# Patient Record
Sex: FEMALE | Race: WHITE | NOT HISPANIC OR LATINO | Employment: OTHER | URBAN - NONMETROPOLITAN AREA
[De-identification: names, ages, dates, MRNs, and addresses within clinical notes are randomized per-mention and may not be internally consistent; named-entity substitution may affect disease eponyms.]

---

## 2017-01-23 ENCOUNTER — APPOINTMENT (OUTPATIENT)
Dept: PHYSICAL THERAPY | Facility: CLINIC | Age: 67
End: 2017-01-23
Payer: MEDICARE

## 2017-01-23 PROCEDURE — G8982 BODY POS GOAL STATUS: HCPCS

## 2017-01-23 PROCEDURE — 97110 THERAPEUTIC EXERCISES: CPT

## 2017-01-23 PROCEDURE — 97116 GAIT TRAINING THERAPY: CPT

## 2017-01-23 PROCEDURE — 97140 MANUAL THERAPY 1/> REGIONS: CPT

## 2017-01-23 PROCEDURE — G8981 BODY POS CURRENT STATUS: HCPCS

## 2017-01-23 PROCEDURE — 97161 PT EVAL LOW COMPLEX 20 MIN: CPT

## 2017-01-25 ENCOUNTER — APPOINTMENT (OUTPATIENT)
Dept: PHYSICAL THERAPY | Facility: CLINIC | Age: 67
End: 2017-01-25
Payer: MEDICARE

## 2017-01-25 PROCEDURE — 97140 MANUAL THERAPY 1/> REGIONS: CPT

## 2017-01-25 PROCEDURE — 97110 THERAPEUTIC EXERCISES: CPT

## 2017-01-26 ENCOUNTER — APPOINTMENT (OUTPATIENT)
Dept: PHYSICAL THERAPY | Facility: CLINIC | Age: 67
End: 2017-01-26
Payer: MEDICARE

## 2017-01-26 PROCEDURE — 97140 MANUAL THERAPY 1/> REGIONS: CPT

## 2017-01-26 PROCEDURE — 97110 THERAPEUTIC EXERCISES: CPT

## 2017-01-26 PROCEDURE — 97010 HOT OR COLD PACKS THERAPY: CPT

## 2017-01-30 ENCOUNTER — APPOINTMENT (OUTPATIENT)
Dept: PHYSICAL THERAPY | Facility: CLINIC | Age: 67
End: 2017-01-30
Payer: MEDICARE

## 2017-01-30 PROCEDURE — 97110 THERAPEUTIC EXERCISES: CPT

## 2017-01-30 PROCEDURE — 97010 HOT OR COLD PACKS THERAPY: CPT

## 2017-01-30 PROCEDURE — 97140 MANUAL THERAPY 1/> REGIONS: CPT

## 2017-02-01 ENCOUNTER — APPOINTMENT (OUTPATIENT)
Dept: PHYSICAL THERAPY | Facility: CLINIC | Age: 67
End: 2017-02-01
Payer: MEDICARE

## 2017-02-01 PROCEDURE — 97140 MANUAL THERAPY 1/> REGIONS: CPT

## 2017-02-01 PROCEDURE — 97110 THERAPEUTIC EXERCISES: CPT

## 2017-02-01 PROCEDURE — 97014 ELECTRIC STIMULATION THERAPY: CPT

## 2017-02-02 ENCOUNTER — APPOINTMENT (OUTPATIENT)
Dept: PHYSICAL THERAPY | Facility: CLINIC | Age: 67
End: 2017-02-02
Payer: MEDICARE

## 2017-02-02 PROCEDURE — 97116 GAIT TRAINING THERAPY: CPT

## 2017-02-02 PROCEDURE — 97110 THERAPEUTIC EXERCISES: CPT

## 2017-02-02 PROCEDURE — 97140 MANUAL THERAPY 1/> REGIONS: CPT

## 2017-02-02 PROCEDURE — 97014 ELECTRIC STIMULATION THERAPY: CPT

## 2017-02-06 ENCOUNTER — APPOINTMENT (OUTPATIENT)
Dept: PHYSICAL THERAPY | Facility: CLINIC | Age: 67
End: 2017-02-06
Payer: MEDICARE

## 2017-02-06 PROCEDURE — 97110 THERAPEUTIC EXERCISES: CPT

## 2017-02-06 PROCEDURE — 97140 MANUAL THERAPY 1/> REGIONS: CPT

## 2017-02-06 PROCEDURE — 97010 HOT OR COLD PACKS THERAPY: CPT

## 2017-02-08 ENCOUNTER — APPOINTMENT (OUTPATIENT)
Dept: PHYSICAL THERAPY | Facility: CLINIC | Age: 67
End: 2017-02-08
Payer: MEDICARE

## 2017-02-08 PROCEDURE — 97110 THERAPEUTIC EXERCISES: CPT

## 2017-02-08 PROCEDURE — 97010 HOT OR COLD PACKS THERAPY: CPT

## 2017-02-08 PROCEDURE — 97140 MANUAL THERAPY 1/> REGIONS: CPT

## 2017-02-09 ENCOUNTER — APPOINTMENT (OUTPATIENT)
Dept: PHYSICAL THERAPY | Facility: CLINIC | Age: 67
End: 2017-02-09
Payer: MEDICARE

## 2017-02-13 ENCOUNTER — APPOINTMENT (OUTPATIENT)
Dept: PHYSICAL THERAPY | Facility: CLINIC | Age: 67
End: 2017-02-13
Payer: MEDICARE

## 2017-02-13 PROCEDURE — 97140 MANUAL THERAPY 1/> REGIONS: CPT

## 2017-02-13 PROCEDURE — 97014 ELECTRIC STIMULATION THERAPY: CPT

## 2017-02-13 PROCEDURE — 97110 THERAPEUTIC EXERCISES: CPT

## 2017-02-15 ENCOUNTER — APPOINTMENT (OUTPATIENT)
Dept: PHYSICAL THERAPY | Facility: CLINIC | Age: 67
End: 2017-02-15
Payer: MEDICARE

## 2017-02-15 PROCEDURE — 97110 THERAPEUTIC EXERCISES: CPT

## 2017-02-15 PROCEDURE — 97140 MANUAL THERAPY 1/> REGIONS: CPT

## 2017-02-15 PROCEDURE — G8982 BODY POS GOAL STATUS: HCPCS

## 2017-02-15 PROCEDURE — 97010 HOT OR COLD PACKS THERAPY: CPT

## 2017-02-15 PROCEDURE — 97150 GROUP THERAPEUTIC PROCEDURES: CPT

## 2017-02-15 PROCEDURE — G8981 BODY POS CURRENT STATUS: HCPCS

## 2017-02-16 ENCOUNTER — APPOINTMENT (OUTPATIENT)
Dept: PHYSICAL THERAPY | Facility: CLINIC | Age: 67
End: 2017-02-16
Payer: MEDICARE

## 2017-02-16 PROCEDURE — 97150 GROUP THERAPEUTIC PROCEDURES: CPT

## 2017-02-16 PROCEDURE — 97110 THERAPEUTIC EXERCISES: CPT

## 2017-02-16 PROCEDURE — 97140 MANUAL THERAPY 1/> REGIONS: CPT

## 2017-02-16 PROCEDURE — 97010 HOT OR COLD PACKS THERAPY: CPT

## 2017-02-20 ENCOUNTER — APPOINTMENT (OUTPATIENT)
Dept: PHYSICAL THERAPY | Facility: CLINIC | Age: 67
End: 2017-02-20
Payer: MEDICARE

## 2017-02-20 PROCEDURE — 97140 MANUAL THERAPY 1/> REGIONS: CPT

## 2017-02-20 PROCEDURE — 97010 HOT OR COLD PACKS THERAPY: CPT

## 2017-02-20 PROCEDURE — 97110 THERAPEUTIC EXERCISES: CPT

## 2017-02-21 ENCOUNTER — GENERIC CONVERSION - ENCOUNTER (OUTPATIENT)
Dept: OTHER | Facility: OTHER | Age: 67
End: 2017-02-21

## 2017-02-22 ENCOUNTER — APPOINTMENT (OUTPATIENT)
Dept: PHYSICAL THERAPY | Facility: CLINIC | Age: 67
End: 2017-02-22
Payer: MEDICARE

## 2017-02-22 PROCEDURE — 97140 MANUAL THERAPY 1/> REGIONS: CPT

## 2017-02-22 PROCEDURE — 97110 THERAPEUTIC EXERCISES: CPT

## 2017-02-22 PROCEDURE — 97010 HOT OR COLD PACKS THERAPY: CPT

## 2017-02-23 ENCOUNTER — APPOINTMENT (OUTPATIENT)
Dept: PHYSICAL THERAPY | Facility: CLINIC | Age: 67
End: 2017-02-23
Payer: MEDICARE

## 2017-02-23 PROCEDURE — 97110 THERAPEUTIC EXERCISES: CPT

## 2017-02-23 PROCEDURE — 97140 MANUAL THERAPY 1/> REGIONS: CPT

## 2017-02-23 PROCEDURE — 97010 HOT OR COLD PACKS THERAPY: CPT

## 2017-02-24 ENCOUNTER — LAB REQUISITION (OUTPATIENT)
Dept: LAB | Facility: HOSPITAL | Age: 67
End: 2017-02-24
Payer: MEDICARE

## 2017-02-24 ENCOUNTER — ALLSCRIPTS OFFICE VISIT (OUTPATIENT)
Dept: FAMILY MEDICINE CLINIC | Facility: CLINIC | Age: 67
End: 2017-02-24
Payer: MEDICARE

## 2017-02-24 DIAGNOSIS — R80.9 PROTEINURIA: ICD-10-CM

## 2017-02-24 DIAGNOSIS — E11.9 TYPE 2 DIABETES MELLITUS WITHOUT COMPLICATIONS (HCC): ICD-10-CM

## 2017-02-24 DIAGNOSIS — N28.9 DISORDER OF KIDNEY AND URETER: ICD-10-CM

## 2017-02-24 DIAGNOSIS — E78.5 HYPERLIPIDEMIA: ICD-10-CM

## 2017-02-24 DIAGNOSIS — N18.30 CHRONIC KIDNEY DISEASE, STAGE III (MODERATE) (HCC): ICD-10-CM

## 2017-02-24 DIAGNOSIS — N25.81 SECONDARY HYPERPARATHYROIDISM OF RENAL ORIGIN (HCC): ICD-10-CM

## 2017-02-24 DIAGNOSIS — E03.9 HYPOTHYROIDISM: ICD-10-CM

## 2017-02-24 LAB
ANION GAP SERPL CALCULATED.3IONS-SCNC: 8 MMOL/L (ref 4–13)
BACTERIA UR QL AUTO: ABNORMAL /HPF
BILIRUB UR QL STRIP: NEGATIVE
BUN SERPL-MCNC: 31 MG/DL (ref 5–25)
CALCIUM SERPL-MCNC: 9.6 MG/DL (ref 8.3–10.1)
CHLORIDE SERPL-SCNC: 103 MMOL/L (ref 100–108)
CLARITY UR: CLEAR
CO2 SERPL-SCNC: 28 MMOL/L (ref 21–32)
COLOR UR: ABNORMAL
CREAT SERPL-MCNC: 1.5 MG/DL (ref 0.6–1.3)
CREAT UR-MCNC: 154 MG/DL
GFR SERPL CREATININE-BSD FRML MDRD: 34.5 ML/MIN/1.73SQ M
GLUCOSE SERPL-MCNC: 112 MG/DL (ref 65–140)
GLUCOSE UR STRIP-MCNC: NEGATIVE MG/DL
HGB UR QL STRIP.AUTO: NEGATIVE
HYALINE CASTS #/AREA URNS LPF: ABNORMAL /LPF
KETONES UR STRIP-MCNC: NEGATIVE MG/DL
LEUKOCYTE ESTERASE UR QL STRIP: NEGATIVE
MICROALBUMIN UR-MCNC: 49 MG/L (ref 0–20)
MICROALBUMIN/CREAT 24H UR: 32 MG/G CREATININE (ref 0–30)
NITRITE UR QL STRIP: NEGATIVE
NON-SQ EPI CELLS URNS QL MICRO: ABNORMAL /HPF
PH UR STRIP.AUTO: 5.5 [PH] (ref 4.5–8)
POTASSIUM SERPL-SCNC: 4.5 MMOL/L (ref 3.5–5.3)
PROT UR STRIP-MCNC: NEGATIVE MG/DL
PTH-INTACT SERPL-MCNC: 11.6 PG/ML (ref 14–72)
RBC #/AREA URNS AUTO: ABNORMAL /HPF
SODIUM SERPL-SCNC: 139 MMOL/L (ref 136–145)
SP GR UR STRIP.AUTO: 1.02 (ref 1–1.03)
UROBILINOGEN UR QL STRIP.AUTO: 0.2 E.U./DL
WBC #/AREA URNS AUTO: ABNORMAL /HPF

## 2017-02-24 PROCEDURE — 82570 ASSAY OF URINE CREATININE: CPT | Performed by: INTERNAL MEDICINE

## 2017-02-24 PROCEDURE — 83970 ASSAY OF PARATHORMONE: CPT | Performed by: INTERNAL MEDICINE

## 2017-02-24 PROCEDURE — 81001 URINALYSIS AUTO W/SCOPE: CPT | Performed by: INTERNAL MEDICINE

## 2017-02-24 PROCEDURE — 82043 UR ALBUMIN QUANTITATIVE: CPT | Performed by: INTERNAL MEDICINE

## 2017-02-24 PROCEDURE — 99213 OFFICE O/P EST LOW 20 MIN: CPT | Performed by: PHYSICIAN ASSISTANT

## 2017-02-24 PROCEDURE — 80048 BASIC METABOLIC PNL TOTAL CA: CPT | Performed by: INTERNAL MEDICINE

## 2017-02-28 ENCOUNTER — APPOINTMENT (OUTPATIENT)
Dept: PHYSICAL THERAPY | Facility: CLINIC | Age: 67
End: 2017-02-28
Payer: MEDICARE

## 2017-02-28 PROCEDURE — 97010 HOT OR COLD PACKS THERAPY: CPT

## 2017-02-28 PROCEDURE — 97110 THERAPEUTIC EXERCISES: CPT

## 2017-02-28 PROCEDURE — 97140 MANUAL THERAPY 1/> REGIONS: CPT

## 2017-03-01 ENCOUNTER — APPOINTMENT (OUTPATIENT)
Dept: PHYSICAL THERAPY | Facility: CLINIC | Age: 67
End: 2017-03-01
Payer: MEDICARE

## 2017-03-01 PROCEDURE — 97110 THERAPEUTIC EXERCISES: CPT

## 2017-03-01 PROCEDURE — 97140 MANUAL THERAPY 1/> REGIONS: CPT

## 2017-03-02 ENCOUNTER — APPOINTMENT (OUTPATIENT)
Dept: PHYSICAL THERAPY | Facility: CLINIC | Age: 67
End: 2017-03-02
Payer: MEDICARE

## 2017-03-02 ENCOUNTER — GENERIC CONVERSION - ENCOUNTER (OUTPATIENT)
Dept: OTHER | Facility: OTHER | Age: 67
End: 2017-03-02

## 2017-03-02 PROCEDURE — 97110 THERAPEUTIC EXERCISES: CPT

## 2017-03-02 PROCEDURE — 97140 MANUAL THERAPY 1/> REGIONS: CPT

## 2017-03-02 PROCEDURE — 97010 HOT OR COLD PACKS THERAPY: CPT

## 2017-03-07 ENCOUNTER — APPOINTMENT (OUTPATIENT)
Dept: PHYSICAL THERAPY | Facility: CLINIC | Age: 67
End: 2017-03-07
Payer: MEDICARE

## 2017-03-07 PROCEDURE — 97110 THERAPEUTIC EXERCISES: CPT

## 2017-03-07 PROCEDURE — 97140 MANUAL THERAPY 1/> REGIONS: CPT

## 2017-03-08 ENCOUNTER — APPOINTMENT (OUTPATIENT)
Dept: PHYSICAL THERAPY | Facility: CLINIC | Age: 67
End: 2017-03-08
Payer: MEDICARE

## 2017-03-08 PROCEDURE — 97140 MANUAL THERAPY 1/> REGIONS: CPT

## 2017-03-08 PROCEDURE — 97110 THERAPEUTIC EXERCISES: CPT

## 2017-03-08 PROCEDURE — 97010 HOT OR COLD PACKS THERAPY: CPT

## 2017-03-09 ENCOUNTER — APPOINTMENT (OUTPATIENT)
Dept: PHYSICAL THERAPY | Facility: CLINIC | Age: 67
End: 2017-03-09
Payer: MEDICARE

## 2017-03-09 PROCEDURE — 97140 MANUAL THERAPY 1/> REGIONS: CPT

## 2017-03-09 PROCEDURE — G8982 BODY POS GOAL STATUS: HCPCS

## 2017-03-09 PROCEDURE — G8981 BODY POS CURRENT STATUS: HCPCS

## 2017-03-09 PROCEDURE — 97014 ELECTRIC STIMULATION THERAPY: CPT

## 2017-03-09 PROCEDURE — 97110 THERAPEUTIC EXERCISES: CPT

## 2017-03-13 ENCOUNTER — APPOINTMENT (OUTPATIENT)
Dept: PHYSICAL THERAPY | Facility: CLINIC | Age: 67
End: 2017-03-13
Payer: MEDICARE

## 2017-03-13 PROCEDURE — 97110 THERAPEUTIC EXERCISES: CPT

## 2017-03-13 PROCEDURE — 97140 MANUAL THERAPY 1/> REGIONS: CPT

## 2017-03-14 ENCOUNTER — APPOINTMENT (OUTPATIENT)
Dept: PHYSICAL THERAPY | Facility: CLINIC | Age: 67
End: 2017-03-14
Payer: MEDICARE

## 2017-03-15 ENCOUNTER — APPOINTMENT (OUTPATIENT)
Dept: PHYSICAL THERAPY | Facility: CLINIC | Age: 67
End: 2017-03-15
Payer: MEDICARE

## 2017-03-15 PROCEDURE — 97110 THERAPEUTIC EXERCISES: CPT

## 2017-03-15 PROCEDURE — 97140 MANUAL THERAPY 1/> REGIONS: CPT

## 2017-03-17 ENCOUNTER — APPOINTMENT (OUTPATIENT)
Dept: PHYSICAL THERAPY | Facility: CLINIC | Age: 67
End: 2017-03-17
Payer: MEDICARE

## 2017-03-17 PROCEDURE — 97140 MANUAL THERAPY 1/> REGIONS: CPT

## 2017-03-17 PROCEDURE — 97110 THERAPEUTIC EXERCISES: CPT

## 2017-03-28 ENCOUNTER — APPOINTMENT (OUTPATIENT)
Dept: PHYSICAL THERAPY | Facility: CLINIC | Age: 67
End: 2017-03-28
Payer: MEDICARE

## 2017-03-28 PROCEDURE — 97140 MANUAL THERAPY 1/> REGIONS: CPT

## 2017-03-28 PROCEDURE — 97110 THERAPEUTIC EXERCISES: CPT

## 2017-03-30 ENCOUNTER — APPOINTMENT (OUTPATIENT)
Dept: PHYSICAL THERAPY | Facility: CLINIC | Age: 67
End: 2017-03-30
Payer: MEDICARE

## 2017-03-30 ENCOUNTER — TRANSCRIBE ORDERS (OUTPATIENT)
Dept: LAB | Facility: MEDICAL CENTER | Age: 67
End: 2017-03-30

## 2017-03-30 ENCOUNTER — APPOINTMENT (OUTPATIENT)
Dept: LAB | Facility: MEDICAL CENTER | Age: 67
End: 2017-03-30
Payer: MEDICARE

## 2017-03-30 DIAGNOSIS — E11.9 TYPE 2 DIABETES MELLITUS WITHOUT COMPLICATIONS (HCC): ICD-10-CM

## 2017-03-30 DIAGNOSIS — N28.9 DISORDER OF KIDNEY AND URETER: ICD-10-CM

## 2017-03-30 DIAGNOSIS — N28.9 UNSPECIFIED DISORDER OF KIDNEY AND URETER: Primary | ICD-10-CM

## 2017-03-30 DIAGNOSIS — E78.5 HYPERLIPIDEMIA: ICD-10-CM

## 2017-03-30 DIAGNOSIS — E03.9 HYPOTHYROIDISM: ICD-10-CM

## 2017-03-30 LAB
ALBUMIN SERPL BCP-MCNC: 3.5 G/DL (ref 3.5–5)
ALP SERPL-CCNC: 64 U/L (ref 46–116)
ALT SERPL W P-5'-P-CCNC: 39 U/L (ref 12–78)
ANION GAP SERPL CALCULATED.3IONS-SCNC: 7 MMOL/L (ref 4–13)
AST SERPL W P-5'-P-CCNC: 35 U/L (ref 5–45)
BASOPHILS # BLD AUTO: 0.03 THOUSANDS/ΜL (ref 0–0.1)
BASOPHILS NFR BLD AUTO: 0 % (ref 0–1)
BILIRUB SERPL-MCNC: 0.42 MG/DL (ref 0.2–1)
BUN SERPL-MCNC: 29 MG/DL (ref 5–25)
CALCIUM SERPL-MCNC: 9.4 MG/DL (ref 8.3–10.1)
CHLORIDE SERPL-SCNC: 101 MMOL/L (ref 100–108)
CHOLEST SERPL-MCNC: 216 MG/DL (ref 50–200)
CO2 SERPL-SCNC: 29 MMOL/L (ref 21–32)
CREAT SERPL-MCNC: 1.47 MG/DL (ref 0.6–1.3)
CREAT UR-MCNC: 174 MG/DL
EOSINOPHIL # BLD AUTO: 0.39 THOUSAND/ΜL (ref 0–0.61)
EOSINOPHIL NFR BLD AUTO: 6 % (ref 0–6)
ERYTHROCYTE [DISTWIDTH] IN BLOOD BY AUTOMATED COUNT: 13.7 % (ref 11.6–15.1)
GFR SERPL CREATININE-BSD FRML MDRD: 35.3 ML/MIN/1.73SQ M
GLUCOSE P FAST SERPL-MCNC: 91 MG/DL (ref 65–99)
HCT VFR BLD AUTO: 34.7 % (ref 34.8–46.1)
HDLC SERPL-MCNC: 34 MG/DL (ref 40–60)
HGB BLD-MCNC: 11.5 G/DL (ref 11.5–15.4)
LDLC SERPL CALC-MCNC: 130 MG/DL (ref 0–100)
LYMPHOCYTES # BLD AUTO: 2.59 THOUSANDS/ΜL (ref 0.6–4.47)
LYMPHOCYTES NFR BLD AUTO: 38 % (ref 14–44)
MCH RBC QN AUTO: 32.4 PG (ref 26.8–34.3)
MCHC RBC AUTO-ENTMCNC: 33.1 G/DL (ref 31.4–37.4)
MCV RBC AUTO: 98 FL (ref 82–98)
MICROALBUMIN UR-MCNC: 82.9 MG/L (ref 0–20)
MICROALBUMIN/CREAT 24H UR: 48 MG/G CREATININE (ref 0–30)
MONOCYTES # BLD AUTO: 0.41 THOUSAND/ΜL (ref 0.17–1.22)
MONOCYTES NFR BLD AUTO: 6 % (ref 4–12)
NEUTROPHILS # BLD AUTO: 3.31 THOUSANDS/ΜL (ref 1.85–7.62)
NEUTS SEG NFR BLD AUTO: 50 % (ref 43–75)
NRBC BLD AUTO-RTO: 0 /100 WBCS
PLATELET # BLD AUTO: 213 THOUSANDS/UL (ref 149–390)
PMV BLD AUTO: 12.2 FL (ref 8.9–12.7)
POTASSIUM SERPL-SCNC: 4 MMOL/L (ref 3.5–5.3)
PROT SERPL-MCNC: 7.8 G/DL (ref 6.4–8.2)
RBC # BLD AUTO: 3.55 MILLION/UL (ref 3.81–5.12)
SODIUM SERPL-SCNC: 137 MMOL/L (ref 136–145)
TRIGL SERPL-MCNC: 258 MG/DL
TSH SERPL DL<=0.05 MIU/L-ACNC: 0.78 UIU/ML (ref 0.36–3.74)
WBC # BLD AUTO: 6.74 THOUSAND/UL (ref 4.31–10.16)

## 2017-03-30 PROCEDURE — 97014 ELECTRIC STIMULATION THERAPY: CPT

## 2017-03-30 PROCEDURE — 82043 UR ALBUMIN QUANTITATIVE: CPT | Performed by: PHYSICIAN ASSISTANT

## 2017-03-30 PROCEDURE — 80061 LIPID PANEL: CPT

## 2017-03-30 PROCEDURE — 97110 THERAPEUTIC EXERCISES: CPT

## 2017-03-30 PROCEDURE — 80053 COMPREHEN METABOLIC PANEL: CPT

## 2017-03-30 PROCEDURE — 36415 COLL VENOUS BLD VENIPUNCTURE: CPT

## 2017-03-30 PROCEDURE — 84443 ASSAY THYROID STIM HORMONE: CPT

## 2017-03-30 PROCEDURE — 85025 COMPLETE CBC W/AUTO DIFF WBC: CPT

## 2017-03-30 PROCEDURE — 97140 MANUAL THERAPY 1/> REGIONS: CPT

## 2017-03-30 PROCEDURE — 82570 ASSAY OF URINE CREATININE: CPT | Performed by: PHYSICIAN ASSISTANT

## 2017-03-31 ENCOUNTER — APPOINTMENT (OUTPATIENT)
Dept: PHYSICAL THERAPY | Facility: CLINIC | Age: 67
End: 2017-03-31
Payer: MEDICARE

## 2017-03-31 PROCEDURE — 97110 THERAPEUTIC EXERCISES: CPT

## 2017-03-31 PROCEDURE — 97140 MANUAL THERAPY 1/> REGIONS: CPT

## 2017-03-31 PROCEDURE — 97014 ELECTRIC STIMULATION THERAPY: CPT

## 2017-04-04 ENCOUNTER — APPOINTMENT (OUTPATIENT)
Dept: PHYSICAL THERAPY | Facility: CLINIC | Age: 67
End: 2017-04-04
Payer: MEDICARE

## 2017-04-04 PROCEDURE — 97010 HOT OR COLD PACKS THERAPY: CPT

## 2017-04-04 PROCEDURE — 97110 THERAPEUTIC EXERCISES: CPT

## 2017-04-04 PROCEDURE — 97140 MANUAL THERAPY 1/> REGIONS: CPT

## 2017-04-05 ENCOUNTER — ALLSCRIPTS OFFICE VISIT (OUTPATIENT)
Dept: FAMILY MEDICINE CLINIC | Facility: CLINIC | Age: 67
End: 2017-04-05
Payer: MEDICARE

## 2017-04-05 ENCOUNTER — APPOINTMENT (OUTPATIENT)
Dept: PHYSICAL THERAPY | Facility: CLINIC | Age: 67
End: 2017-04-05
Payer: MEDICARE

## 2017-04-05 LAB
GLUCOSE SERPL-MCNC: 122 MG/DL
GLUCOSE SERPL-MCNC: 122 MG/DL (ref 65–140)
HBA1C MFR BLD HPLC: 6.2 %

## 2017-04-05 PROCEDURE — 82948 REAGENT STRIP/BLOOD GLUCOSE: CPT | Performed by: PHYSICIAN ASSISTANT

## 2017-04-05 PROCEDURE — 99214 OFFICE O/P EST MOD 30 MIN: CPT | Performed by: PHYSICIAN ASSISTANT

## 2017-04-05 PROCEDURE — 83036 HEMOGLOBIN GLYCOSYLATED A1C: CPT | Performed by: PHYSICIAN ASSISTANT

## 2017-04-06 ENCOUNTER — APPOINTMENT (OUTPATIENT)
Dept: PHYSICAL THERAPY | Facility: CLINIC | Age: 67
End: 2017-04-06
Payer: MEDICARE

## 2017-04-06 ENCOUNTER — GENERIC CONVERSION - ENCOUNTER (OUTPATIENT)
Dept: OTHER | Facility: OTHER | Age: 67
End: 2017-04-06

## 2017-04-06 PROCEDURE — 97140 MANUAL THERAPY 1/> REGIONS: CPT

## 2017-04-06 PROCEDURE — 97010 HOT OR COLD PACKS THERAPY: CPT

## 2017-04-06 PROCEDURE — G8981 BODY POS CURRENT STATUS: HCPCS

## 2017-04-06 PROCEDURE — 97110 THERAPEUTIC EXERCISES: CPT

## 2017-04-06 PROCEDURE — G8982 BODY POS GOAL STATUS: HCPCS

## 2017-04-11 ENCOUNTER — APPOINTMENT (OUTPATIENT)
Dept: PHYSICAL THERAPY | Facility: CLINIC | Age: 67
End: 2017-04-11
Payer: MEDICARE

## 2017-04-11 PROCEDURE — 97140 MANUAL THERAPY 1/> REGIONS: CPT

## 2017-04-11 PROCEDURE — 97014 ELECTRIC STIMULATION THERAPY: CPT

## 2017-04-11 PROCEDURE — 97110 THERAPEUTIC EXERCISES: CPT

## 2017-04-12 ENCOUNTER — APPOINTMENT (OUTPATIENT)
Dept: PHYSICAL THERAPY | Facility: CLINIC | Age: 67
End: 2017-04-12
Payer: MEDICARE

## 2017-04-12 PROCEDURE — 97140 MANUAL THERAPY 1/> REGIONS: CPT

## 2017-04-12 PROCEDURE — 97010 HOT OR COLD PACKS THERAPY: CPT

## 2017-04-12 PROCEDURE — 97110 THERAPEUTIC EXERCISES: CPT

## 2017-04-13 ENCOUNTER — APPOINTMENT (OUTPATIENT)
Dept: PHYSICAL THERAPY | Facility: CLINIC | Age: 67
End: 2017-04-13
Payer: MEDICARE

## 2017-04-13 PROCEDURE — 97110 THERAPEUTIC EXERCISES: CPT

## 2017-04-13 PROCEDURE — 97140 MANUAL THERAPY 1/> REGIONS: CPT

## 2017-04-13 PROCEDURE — 97014 ELECTRIC STIMULATION THERAPY: CPT

## 2017-04-17 ENCOUNTER — GENERIC CONVERSION - ENCOUNTER (OUTPATIENT)
Dept: OTHER | Facility: OTHER | Age: 67
End: 2017-04-17

## 2017-04-18 ENCOUNTER — APPOINTMENT (OUTPATIENT)
Dept: PHYSICAL THERAPY | Facility: CLINIC | Age: 67
End: 2017-04-18
Payer: MEDICARE

## 2017-04-18 PROCEDURE — 97014 ELECTRIC STIMULATION THERAPY: CPT

## 2017-04-18 PROCEDURE — 97110 THERAPEUTIC EXERCISES: CPT

## 2017-04-18 PROCEDURE — 97140 MANUAL THERAPY 1/> REGIONS: CPT

## 2017-04-19 ENCOUNTER — APPOINTMENT (OUTPATIENT)
Dept: PHYSICAL THERAPY | Facility: CLINIC | Age: 67
End: 2017-04-19
Payer: MEDICARE

## 2017-04-19 PROCEDURE — 97150 GROUP THERAPEUTIC PROCEDURES: CPT

## 2017-04-19 PROCEDURE — 97140 MANUAL THERAPY 1/> REGIONS: CPT

## 2017-04-19 PROCEDURE — 97110 THERAPEUTIC EXERCISES: CPT

## 2017-04-19 PROCEDURE — 97014 ELECTRIC STIMULATION THERAPY: CPT

## 2017-04-20 ENCOUNTER — APPOINTMENT (OUTPATIENT)
Dept: PHYSICAL THERAPY | Facility: CLINIC | Age: 67
End: 2017-04-20
Payer: MEDICARE

## 2017-04-20 PROCEDURE — 97110 THERAPEUTIC EXERCISES: CPT

## 2017-04-20 PROCEDURE — 97014 ELECTRIC STIMULATION THERAPY: CPT

## 2017-04-20 PROCEDURE — 97150 GROUP THERAPEUTIC PROCEDURES: CPT

## 2017-04-20 PROCEDURE — 97140 MANUAL THERAPY 1/> REGIONS: CPT

## 2017-04-25 ENCOUNTER — APPOINTMENT (OUTPATIENT)
Dept: PHYSICAL THERAPY | Facility: CLINIC | Age: 67
End: 2017-04-25
Payer: MEDICARE

## 2017-04-25 PROCEDURE — 97140 MANUAL THERAPY 1/> REGIONS: CPT

## 2017-04-25 PROCEDURE — 97110 THERAPEUTIC EXERCISES: CPT

## 2017-04-25 PROCEDURE — 97014 ELECTRIC STIMULATION THERAPY: CPT

## 2017-04-26 ENCOUNTER — APPOINTMENT (OUTPATIENT)
Dept: PHYSICAL THERAPY | Facility: CLINIC | Age: 67
End: 2017-04-26
Payer: MEDICARE

## 2017-04-26 PROCEDURE — 97110 THERAPEUTIC EXERCISES: CPT

## 2017-04-26 PROCEDURE — 97140 MANUAL THERAPY 1/> REGIONS: CPT

## 2017-04-26 PROCEDURE — 97150 GROUP THERAPEUTIC PROCEDURES: CPT

## 2017-04-26 PROCEDURE — 97014 ELECTRIC STIMULATION THERAPY: CPT

## 2017-04-27 ENCOUNTER — APPOINTMENT (OUTPATIENT)
Dept: PHYSICAL THERAPY | Facility: CLINIC | Age: 67
End: 2017-04-27
Payer: MEDICARE

## 2017-04-27 PROCEDURE — 97014 ELECTRIC STIMULATION THERAPY: CPT

## 2017-04-27 PROCEDURE — G8983 BODY POS D/C STATUS: HCPCS

## 2017-04-27 PROCEDURE — 97150 GROUP THERAPEUTIC PROCEDURES: CPT

## 2017-04-27 PROCEDURE — 97110 THERAPEUTIC EXERCISES: CPT

## 2017-04-27 PROCEDURE — 97140 MANUAL THERAPY 1/> REGIONS: CPT

## 2017-04-27 PROCEDURE — G8982 BODY POS GOAL STATUS: HCPCS

## 2017-05-04 ENCOUNTER — GENERIC CONVERSION - ENCOUNTER (OUTPATIENT)
Dept: OTHER | Facility: OTHER | Age: 67
End: 2017-05-04

## 2017-05-09 ENCOUNTER — GENERIC CONVERSION - ENCOUNTER (OUTPATIENT)
Dept: OTHER | Facility: OTHER | Age: 67
End: 2017-05-09

## 2017-05-24 ENCOUNTER — TRANSCRIBE ORDERS (OUTPATIENT)
Dept: ADMINISTRATIVE | Facility: HOSPITAL | Age: 67
End: 2017-05-24

## 2017-05-24 DIAGNOSIS — N63.0 BREAST LUMP: ICD-10-CM

## 2017-06-01 ENCOUNTER — HOSPITAL ENCOUNTER (OUTPATIENT)
Dept: MAMMOGRAPHY | Facility: HOSPITAL | Age: 67
Discharge: HOME | End: 2017-06-01
Payer: MEDICARE

## 2017-06-01 ENCOUNTER — HOSPITAL ENCOUNTER (OUTPATIENT)
Dept: ULTRASOUND IMAGING | Facility: HOSPITAL | Age: 67
Discharge: HOME | End: 2017-06-01
Payer: MEDICARE

## 2017-06-01 DIAGNOSIS — IMO0002 LUMP: ICD-10-CM

## 2017-06-01 DIAGNOSIS — N63.0 BREAST LUMP: ICD-10-CM

## 2017-06-01 PROCEDURE — G0204 DX MAMMO INCL CAD BI: HCPCS

## 2017-06-01 PROCEDURE — 76642 ULTRASOUND BREAST LIMITED: CPT

## 2017-06-07 ENCOUNTER — GENERIC CONVERSION - ENCOUNTER (OUTPATIENT)
Dept: OTHER | Facility: OTHER | Age: 67
End: 2017-06-07

## 2017-07-12 ENCOUNTER — ALLSCRIPTS OFFICE VISIT (OUTPATIENT)
Dept: FAMILY MEDICINE CLINIC | Facility: CLINIC | Age: 67
End: 2017-07-12
Payer: MEDICARE

## 2017-07-12 LAB
GLUCOSE SERPL-MCNC: 91 MG/DL
HBA1C MFR BLD HPLC: 7.1 %

## 2017-07-12 PROCEDURE — 83036 HEMOGLOBIN GLYCOSYLATED A1C: CPT | Performed by: FAMILY MEDICINE

## 2017-07-12 PROCEDURE — 99214 OFFICE O/P EST MOD 30 MIN: CPT | Performed by: FAMILY MEDICINE

## 2017-07-12 PROCEDURE — 82948 REAGENT STRIP/BLOOD GLUCOSE: CPT | Performed by: PHYSICIAN ASSISTANT

## 2017-07-13 LAB — GLUCOSE SERPL-MCNC: 91 MG/DL (ref 65–140)

## 2017-08-28 ENCOUNTER — APPOINTMENT (OUTPATIENT)
Dept: LAB | Facility: MEDICAL CENTER | Age: 67
End: 2017-08-28
Payer: MEDICARE

## 2017-08-28 ENCOUNTER — TRANSCRIBE ORDERS (OUTPATIENT)
Dept: LAB | Facility: MEDICAL CENTER | Age: 67
End: 2017-08-28

## 2017-08-28 DIAGNOSIS — R80.9 PROTEINURIA, UNSPECIFIED TYPE: ICD-10-CM

## 2017-08-28 DIAGNOSIS — N18.30 CHRONIC KIDNEY DISEASE, STAGE III (MODERATE) (HCC): ICD-10-CM

## 2017-08-28 DIAGNOSIS — E21.1 HYPERPARATHYROIDISM DUE TO 1,25(0H)2D3 (HCC): ICD-10-CM

## 2017-08-28 DIAGNOSIS — N18.30 CHRONIC KIDNEY DISEASE, STAGE III (MODERATE) (HCC): Primary | ICD-10-CM

## 2017-08-28 LAB
ANION GAP SERPL CALCULATED.3IONS-SCNC: 8 MMOL/L (ref 4–13)
BACTERIA UR QL AUTO: ABNORMAL /HPF
BILIRUB UR QL STRIP: NEGATIVE
BUN SERPL-MCNC: 35 MG/DL (ref 5–25)
CALCIUM SERPL-MCNC: 9.8 MG/DL (ref 8.3–10.1)
CHLORIDE SERPL-SCNC: 103 MMOL/L (ref 100–108)
CLARITY UR: CLEAR
CO2 SERPL-SCNC: 24 MMOL/L (ref 21–32)
COLOR UR: YELLOW
CREAT SERPL-MCNC: 1.55 MG/DL (ref 0.6–1.3)
CREAT UR-MCNC: 118 MG/DL
GFR SERPL CREATININE-BSD FRML MDRD: 34 ML/MIN/1.73SQ M
GLUCOSE P FAST SERPL-MCNC: 166 MG/DL (ref 65–99)
GLUCOSE UR STRIP-MCNC: NEGATIVE MG/DL
HGB UR QL STRIP.AUTO: NEGATIVE
HYALINE CASTS #/AREA URNS LPF: ABNORMAL /LPF
KETONES UR STRIP-MCNC: NEGATIVE MG/DL
LEUKOCYTE ESTERASE UR QL STRIP: ABNORMAL
MICROALBUMIN UR-MCNC: 84.5 MG/L (ref 0–20)
MICROALBUMIN/CREAT 24H UR: 72 MG/G CREATININE (ref 0–30)
NITRITE UR QL STRIP: NEGATIVE
NON-SQ EPI CELLS URNS QL MICRO: ABNORMAL /HPF
PH UR STRIP.AUTO: 6 [PH] (ref 4.5–8)
POTASSIUM SERPL-SCNC: 4.7 MMOL/L (ref 3.5–5.3)
PROT UR STRIP-MCNC: ABNORMAL MG/DL
PTH-INTACT SERPL-MCNC: 25.1 PG/ML (ref 14–72)
RBC #/AREA URNS AUTO: ABNORMAL /HPF
SODIUM SERPL-SCNC: 135 MMOL/L (ref 136–145)
SP GR UR STRIP.AUTO: 1.02 (ref 1–1.03)
UROBILINOGEN UR QL STRIP.AUTO: 0.2 E.U./DL
WBC #/AREA URNS AUTO: ABNORMAL /HPF

## 2017-08-28 PROCEDURE — 83970 ASSAY OF PARATHORMONE: CPT

## 2017-08-28 PROCEDURE — 82570 ASSAY OF URINE CREATININE: CPT | Performed by: INTERNAL MEDICINE

## 2017-08-28 PROCEDURE — 36415 COLL VENOUS BLD VENIPUNCTURE: CPT

## 2017-08-28 PROCEDURE — 80048 BASIC METABOLIC PNL TOTAL CA: CPT

## 2017-08-28 PROCEDURE — 81001 URINALYSIS AUTO W/SCOPE: CPT | Performed by: INTERNAL MEDICINE

## 2017-08-28 PROCEDURE — 82043 UR ALBUMIN QUANTITATIVE: CPT | Performed by: INTERNAL MEDICINE

## 2017-10-25 ENCOUNTER — APPOINTMENT (OUTPATIENT)
Dept: LAB | Facility: HOSPITAL | Age: 67
End: 2017-10-25
Payer: MEDICARE

## 2017-10-25 ENCOUNTER — ALLSCRIPTS OFFICE VISIT (OUTPATIENT)
Dept: FAMILY MEDICINE CLINIC | Facility: CLINIC | Age: 67
End: 2017-10-25
Payer: MEDICARE

## 2017-10-25 DIAGNOSIS — E11.9 TYPE 2 DIABETES MELLITUS WITHOUT COMPLICATIONS (HCC): ICD-10-CM

## 2017-10-25 DIAGNOSIS — E03.9 HYPOTHYROIDISM: ICD-10-CM

## 2017-10-25 DIAGNOSIS — E55.9 VITAMIN D DEFICIENCY: ICD-10-CM

## 2017-10-25 DIAGNOSIS — E78.5 HYPERLIPIDEMIA: ICD-10-CM

## 2017-10-25 LAB
25(OH)D3 SERPL-MCNC: 51.1 NG/ML (ref 30–100)
ALBUMIN SERPL BCP-MCNC: 3.9 G/DL (ref 3.5–5)
ALP SERPL-CCNC: 64 U/L (ref 46–116)
ALT SERPL W P-5'-P-CCNC: 39 U/L (ref 12–78)
ANION GAP SERPL CALCULATED.3IONS-SCNC: 7 MMOL/L (ref 4–13)
AST SERPL W P-5'-P-CCNC: 31 U/L (ref 5–45)
BASOPHILS # BLD AUTO: 0.03 THOUSANDS/ΜL (ref 0–0.1)
BASOPHILS NFR BLD AUTO: 0 % (ref 0–1)
BILIRUB SERPL-MCNC: 0.48 MG/DL (ref 0.2–1)
BUN SERPL-MCNC: 32 MG/DL (ref 5–25)
CALCIUM SERPL-MCNC: 9.3 MG/DL (ref 8.3–10.1)
CHLORIDE SERPL-SCNC: 103 MMOL/L (ref 100–108)
CHOLEST SERPL-MCNC: 207 MG/DL (ref 50–200)
CO2 SERPL-SCNC: 27 MMOL/L (ref 21–32)
CREAT SERPL-MCNC: 1.55 MG/DL (ref 0.6–1.3)
CREAT UR-MCNC: 113 MG/DL
EOSINOPHIL # BLD AUTO: 0.15 THOUSAND/ΜL (ref 0–0.61)
EOSINOPHIL NFR BLD AUTO: 2 % (ref 0–6)
ERYTHROCYTE [DISTWIDTH] IN BLOOD BY AUTOMATED COUNT: 12.3 % (ref 11.6–15.1)
GFR SERPL CREATININE-BSD FRML MDRD: 34 ML/MIN/1.73SQ M
GLUCOSE P FAST SERPL-MCNC: 98 MG/DL (ref 65–99)
GLUCOSE SERPL-MCNC: 130 MG/DL
GLUCOSE SERPL-MCNC: 130 MG/DL (ref 65–140)
HBA1C MFR BLD HPLC: 7.7 %
HCT VFR BLD AUTO: 38.5 % (ref 34.8–46.1)
HDLC SERPL-MCNC: 32 MG/DL (ref 40–60)
HGB BLD-MCNC: 13.3 G/DL (ref 11.5–15.4)
LDLC SERPL CALC-MCNC: 102 MG/DL (ref 0–100)
LYMPHOCYTES # BLD AUTO: 3.03 THOUSANDS/ΜL (ref 0.6–4.47)
LYMPHOCYTES NFR BLD AUTO: 43 % (ref 14–44)
MCH RBC QN AUTO: 33.1 PG (ref 26.8–34.3)
MCHC RBC AUTO-ENTMCNC: 34.5 G/DL (ref 31.4–37.4)
MCV RBC AUTO: 96 FL (ref 82–98)
MICROALBUMIN UR-MCNC: 54.3 MG/L (ref 0–20)
MICROALBUMIN/CREAT 24H UR: 48 MG/G CREATININE (ref 0–30)
MONOCYTES # BLD AUTO: 0.49 THOUSAND/ΜL (ref 0.17–1.22)
MONOCYTES NFR BLD AUTO: 7 % (ref 4–12)
NEUTROPHILS # BLD AUTO: 3.37 THOUSANDS/ΜL (ref 1.85–7.62)
NEUTS SEG NFR BLD AUTO: 48 % (ref 43–75)
NRBC BLD AUTO-RTO: 0 /100 WBCS
PLATELET # BLD AUTO: 190 THOUSANDS/UL (ref 149–390)
PMV BLD AUTO: 12.6 FL (ref 8.9–12.7)
POTASSIUM SERPL-SCNC: 4.7 MMOL/L (ref 3.5–5.3)
PROT SERPL-MCNC: 7.5 G/DL (ref 6.4–8.2)
RBC # BLD AUTO: 4.02 MILLION/UL (ref 3.81–5.12)
SODIUM SERPL-SCNC: 137 MMOL/L (ref 136–145)
T4 FREE SERPL-MCNC: 1.03 NG/DL (ref 0.76–1.46)
TRIGL SERPL-MCNC: 366 MG/DL
TSH SERPL DL<=0.05 MIU/L-ACNC: 0.97 UIU/ML (ref 0.36–3.74)
WBC # BLD AUTO: 7.08 THOUSAND/UL (ref 4.31–10.16)

## 2017-10-25 PROCEDURE — 82043 UR ALBUMIN QUANTITATIVE: CPT

## 2017-10-25 PROCEDURE — 99214 OFFICE O/P EST MOD 30 MIN: CPT | Performed by: PHYSICIAN ASSISTANT

## 2017-10-25 PROCEDURE — 82948 REAGENT STRIP/BLOOD GLUCOSE: CPT | Performed by: PHYSICIAN ASSISTANT

## 2017-10-25 PROCEDURE — 80053 COMPREHEN METABOLIC PANEL: CPT

## 2017-10-25 PROCEDURE — 84443 ASSAY THYROID STIM HORMONE: CPT

## 2017-10-25 PROCEDURE — 36415 COLL VENOUS BLD VENIPUNCTURE: CPT

## 2017-10-25 PROCEDURE — 82570 ASSAY OF URINE CREATININE: CPT

## 2017-10-25 PROCEDURE — 85025 COMPLETE CBC W/AUTO DIFF WBC: CPT

## 2017-10-25 PROCEDURE — 84439 ASSAY OF FREE THYROXINE: CPT

## 2017-10-25 PROCEDURE — 82306 VITAMIN D 25 HYDROXY: CPT

## 2017-10-25 PROCEDURE — 83036 HEMOGLOBIN GLYCOSYLATED A1C: CPT | Performed by: PHYSICIAN ASSISTANT

## 2017-10-25 PROCEDURE — 80061 LIPID PANEL: CPT

## 2017-10-27 NOTE — PROGRESS NOTES
Assessment  1  Type 2 diabetes mellitus with chronic kidney disease, with long-term current use of   insulin (250 40,585 9,V58 67) (E11 22,Z79 4)   2  Hypothyroidism (244 9) (E03 9)   3  Hyperlipidemia (272 4) (E78 5)   4  Kidney disease (593 9) (N28 9)    Plan   Diabetes mellitus    · Hemoglobin A1c- POC; Status:Complete;   Done: 17PBT1887 11:03AM  Hyperlipidemia, Type 2 diabetes mellitus with chronic kidney disease, with long-term  current use of insulin    · Metoprolol Tartrate 50 MG Oral Tablet; Take 1 tablet twice daily  Type 2 diabetes mellitus with chronic kidney disease, with long-term current use of  insulin    · Blood Glucose- POC; Status:Complete;   Done: 41EOF3498 11:03AM  Fasting (Y/N) : Yes - Y    Ezetimibe-Simvastatin 10-80 MG Oral Tablet; TAKE 1 TABLET DAILY IN THE EVENING  Requested for: 48Csi4660; Last Rx:24Yty4445; Status: ACTIVE Ordered  Rx By: Clive Gutierrez; Dispense: 90 Days ; #:90 Tablet; Refill: 0;   For: Hyperlipidemia; ANGEL = N; Verified Transmission to [address] [place] [address]; Msg to Pharmacy: generic permissable; Last Updated By: System, [employer]; 7/23/2017 1:15:31 PM     Discussion/Summary    Pt is doing well and feels good and utd on will see dr Kate Light shortly and stable as far as kidney studies  The patient was counseled regarding  Chief Complaint  Patient is here for a check up for her diabetes  She states her sugars are running good  No complaints today  History of Present Illness  pt is here for checkup and she had knee trouble and had steroid shot in knee Appetite is ok and sleeps ok and b are ok and knee feeling better sometimes      Review of Systems    Constitutional: No fever, no chills, feels well, no tiredness, no recent weight gain or weight loss  Eyes: No complaints of eye pain, no red eyes, no eyesight problems, no discharge, no dry eyes, no itching of eyes     ENT: no complaints of earache, no loss of hearing, no nose bleeds, no nasal discharge, no sore throat, no hoarseness  Cardiovascular: No complaints of slow heart rate, no fast heart rate, no chest pain, no palpitations, no leg claudication, no lower extremity edema  Respiratory: No complaints of shortness of breath, no wheezing, no cough, no SOB on exertion, no orthopnea, no PND  Gastrointestinal: No complaints of abdominal pain, no constipation, no nausea or vomiting, no diarrhea, no bloody stools  Genitourinary: No complaints of dysuria, no incontinence, no pelvic pain, no dysmenorrhea, no vaginal discharge or bleeding  Musculoskeletal: No complaints of arthralgias, no myalgias, no joint swelling or stiffness, no limb pain or swelling  Integumentary: No complaints of skin rash or lesions, no itching, no skin wounds, no breast pain or lump  Neurological: No complaints of headache, no confusion, no convulsions, no numbness, no dizziness or fainting, no tingling, no limb weakness, no difficulty walking  Psychiatric: Not suicidal, no sleep disturbance, no anxiety or depression, no change in personality, no emotional problems  Endocrine: No complaints of proptosis, no hot flashes, no muscle weakness, no deepening of the voice, no feelings of weakness  Hematologic/Lymphatic: No complaints of swollen glands, no swollen glands in the neck, does not bleed easily, does not bruise easily  ROS reviewed  Active Problems  1  Diabetes mellitus (250 00) (E11 9)   2  Encounter for screening mammogram for breast cancer (V76 12) (Z12 31)   3  Hyperlipidemia (272 4) (E78 5)   4  Hypothyroidism (244 9) (E03 9)   5  Kidney disease (593 9) (N28 9)   6  Left breast lump (611 72) (N63 20)   7  Right knee pain (719 46) (M25 561)   8  Screening for depression (V79 0) (Z13 89)   9  Type 2 diabetes mellitus with chronic kidney disease, with long-term current use of   insulin (250 40,585 9,V58 67) (E11 22,Z79  4)    Past Medical History  1   History of hypertension (V12 59) (Z86 79)    The active problems and past medical history were reviewed and updated today  Surgical History  1  History of Cataract Surgery   2  History of Hysterectomy   3  History of Right Breast Lumpectomy   4  History of Total Knee Replacement Left    The surgical history was reviewed and updated today  Family History  Mother    1  Family history of chronic obstructive pulmonary disease (V17 6) (Z82 5)  Father    2  Family history of diabetes mellitus (V18 0) (Z83 3)   3  Family history of hypertension (V17 49) (Z82 49)   4  FH: CVA (cerebrovascular accident) (V17 1) (Z82 3)    The family history was reviewed and updated today  Social History   · Daily caffeine consumption   · No alcohol use   · Non-smoker (V49 89) (Z78 9)  The social history was reviewed and updated today  Current Meds   1  Aspirin 81 MG TABS; Therapy: (Recorded:10Mar2016) to Recorded   2  BD Pen Needle Short U/F 31G X 8 MM Miscellaneous; USE AS DIRECTED  QID; Therapy: 59Iub6975 to (Last Rx:80Qgy8910)  Requested for: 66Rss7641 Ordered   3  Calcium /Vitamin D TABS; Therapy: (Recorded:10Mar2016) to Recorded   4  Diovan 160 MG Oral Tablet; TAKE 1 TABLET DAILY; Therapy: (Recorded:10Mar2016) to Recorded   5  Fenofibric Acid 135 MG Oral Capsule Delayed Release; Take 1 capsule by mouth  daily; Therapy: 85KEP0650 to (Surinder Vela)  Requested for: 72GMO1402; Last   Rx:07Nov2016 Ordered   6  FreeStyle Lite Test In Vitro Strip; TEST 4 TIMES DAILY; Therapy: 61XYX7798 to (Evaluate:69Pjc2905)  Requested for: 93Sqo3114; Last   Rx:57Lri2470 Ordered   7  Furosemide 40 MG Oral Tablet; TAKE 1 TABLET DAILY AS DIRECTED; Therapy: (Recorded:10Mar2016) to Recorded   8  HumaLOG 100 UNIT/ML Subcutaneous Solution; INJECT 300 UNITS EVERY 3 DAYS VIA   INSULIN PUMP HIGHEST DOSE 75 UNITS DAILY  Requested for: 49FKI4965; Last   Rx:55Ehu4873 Ordered   9  Levothyroxine Sodium 100 MCG Oral Tablet; TAKE 1 TABLET DAILY; Therapy: (Recorded:10Mar2016) to Recorded   10  Metoprolol Tartrate 50 MG Oral Tablet; Take 1 tablet twice daily; Therapy: (Recorded:10Mar2016) to Recorded   11  Omega-3 EPA Fish Oil CAPS; Therapy: (Recorded:10Mar2016) to Recorded   12  Soy Isoflavone 40 MG TABS; Therapy: (Recorded:10Mar2016) to Recorded   13  Super B Complex TABS; Therapy: (Recorded:10Mar2016) to Recorded   14  Victoza 18 MG/3ML Subcutaneous Solution Pen-injector; inject sub q 1 8 units daily; Therapy: 29Dec2016 to (Evaluate:28Apr2017)  Requested for: 29Dec2016; Last    Rx:55Rzv9854 Ordered   15  Vitamin D-3 5000 UNIT TABS; Therapy: (Recorded:10Mar2016) to Recorded   16  Vytorin 10-80 MG Oral Tablet; TAKE 1 TABLET DAILY IN THE EVENING; Therapy: (Recorded:10Mar2016) to Recorded    The medication list was reviewed and updated today  Allergies  1  No Known Drug Allergies    Vitals  Vital Signs    Recorded: 12Jul2017 11:02AM   Temperature 96 8 F   Heart Rate 60   Respiration 16   Systolic 882   Diastolic 82   Height 5 ft 4 in   Weight 216 lb    BMI Calculated 37 08   BSA Calculated 2 02   O2 Saturation 98     Physical Exam    Constitutional   General appearance: No acute distress, well appearing and well nourished  Eyes   Conjunctiva and lids: No swelling, erythema or discharge  Pupils and irises: Equal, round and reactive to light  Ears, Nose, Mouth, and Throat   External inspection of ears and nose: Normal     Otoscopic examination: Tympanic membranes translucent with normal light reflex  Canals patent without erythema  Nasal mucosa, septum, and turbinates: Normal without edema or erythema  Oropharynx: Normal with no erythema, edema, exudate or lesions  Pulmonary   Respiratory effort: No increased work of breathing or signs of respiratory distress  Auscultation of lungs: Clear to auscultation  Cardiovascular   Palpation of heart: Normal PMI, no thrills  Auscultation of heart: Normal rate and rhythm, normal S1 and S2, without murmurs  Examination of extremities for edema and/or varicosities: Normal     Carotid pulses: Normal     Abdomen   Abdomen: Non-tender, no masses  Liver and spleen: No hepatomegaly or splenomegaly  Lymphatic   Palpation of lymph nodes in neck: No lymphadenopathy  Musculoskeletal   Gait and station: Normal     Digits and nails: Normal without clubbing or cyanosis  Inspection/palpation of joints, bones, and muscles: Normal     Skin   Skin and subcutaneous tissue: Normal without rashes or lesions  Neurologic   Cranial nerves: Cranial nerves 2-12 intact  Reflexes: 2+ and symmetric  Sensation: No sensory loss      Psychiatric   Orientation to person, place, and time: Normal     Mood and affect: Normal          Results/Data  Blood Glucose- POC 86GKX7523 11:03AM Autumn Jackman     Test Name Result Flag Reference   Glucose Finger Stick 91       Hemoglobin A1c- POC [id number] 11:03AM Autumn Jackman     Test Name Result Flag Reference   HEMOGLOBIN A1C 7 1         Future Appointments    Date/Time Provider Specialty Site   10/25/2017 11:00 AM Autumn Jackman, [address] [place] [address]     Signatures   Electronically signed by : Romeo Guillen, [facility]; Jul 12 2017 11:12AM EST                       (Author)    Electronically signed by : Yasmeen Stovall MD; Oct  2 2017 10:40PM EST                       (Author)

## 2018-01-14 VITALS
BODY MASS INDEX: 36.88 KG/M2 | HEART RATE: 60 BPM | TEMPERATURE: 96.8 F | HEIGHT: 64 IN | OXYGEN SATURATION: 98 % | WEIGHT: 216 LBS | SYSTOLIC BLOOD PRESSURE: 136 MMHG | RESPIRATION RATE: 16 BRPM | DIASTOLIC BLOOD PRESSURE: 82 MMHG

## 2018-01-14 VITALS
DIASTOLIC BLOOD PRESSURE: 74 MMHG | SYSTOLIC BLOOD PRESSURE: 132 MMHG | TEMPERATURE: 97.2 F | BODY MASS INDEX: 35.85 KG/M2 | RESPIRATION RATE: 16 BRPM | OXYGEN SATURATION: 96 % | HEART RATE: 57 BPM | WEIGHT: 210 LBS | HEIGHT: 64 IN

## 2018-01-14 VITALS
WEIGHT: 210 LBS | SYSTOLIC BLOOD PRESSURE: 130 MMHG | OXYGEN SATURATION: 97 % | RESPIRATION RATE: 20 BRPM | DIASTOLIC BLOOD PRESSURE: 78 MMHG | BODY MASS INDEX: 35.85 KG/M2 | HEIGHT: 64 IN | HEART RATE: 81 BPM | TEMPERATURE: 97.1 F

## 2018-01-22 VITALS
DIASTOLIC BLOOD PRESSURE: 84 MMHG | BODY MASS INDEX: 38.24 KG/M2 | RESPIRATION RATE: 16 BRPM | TEMPERATURE: 96.6 F | WEIGHT: 224 LBS | HEART RATE: 57 BPM | OXYGEN SATURATION: 97 % | HEIGHT: 64 IN | SYSTOLIC BLOOD PRESSURE: 142 MMHG

## 2018-02-07 PROBLEM — N63.20 LEFT BREAST LUMP: Status: ACTIVE | Noted: 2017-05-24

## 2018-02-07 PROBLEM — E55.9 VITAMIN D INSUFFICIENCY: Status: ACTIVE | Noted: 2017-10-25

## 2018-02-08 ENCOUNTER — OFFICE VISIT (OUTPATIENT)
Dept: FAMILY MEDICINE CLINIC | Facility: CLINIC | Age: 68
End: 2018-02-08
Payer: MEDICARE

## 2018-02-08 VITALS
BODY MASS INDEX: 37.9 KG/M2 | HEART RATE: 76 BPM | WEIGHT: 222 LBS | SYSTOLIC BLOOD PRESSURE: 136 MMHG | DIASTOLIC BLOOD PRESSURE: 88 MMHG | OXYGEN SATURATION: 98 % | TEMPERATURE: 96.5 F | HEIGHT: 64 IN | RESPIRATION RATE: 16 BRPM

## 2018-02-08 DIAGNOSIS — Z79.4 TYPE 2 DIABETES MELLITUS WITHOUT COMPLICATION, WITH LONG-TERM CURRENT USE OF INSULIN (HCC): Primary | ICD-10-CM

## 2018-02-08 DIAGNOSIS — E11.9 TYPE 2 DIABETES MELLITUS WITHOUT COMPLICATION, WITH LONG-TERM CURRENT USE OF INSULIN (HCC): Primary | ICD-10-CM

## 2018-02-08 DIAGNOSIS — E55.9 VITAMIN D INSUFFICIENCY: ICD-10-CM

## 2018-02-08 LAB — SL AMB POCT HEMOGLOBIN AIC: 7.1

## 2018-02-08 PROCEDURE — 81001 URINALYSIS AUTO W/SCOPE: CPT | Performed by: FAMILY MEDICINE

## 2018-02-08 PROCEDURE — 83036 HEMOGLOBIN GLYCOSYLATED A1C: CPT | Performed by: FAMILY MEDICINE

## 2018-02-08 PROCEDURE — 82570 ASSAY OF URINE CREATININE: CPT | Performed by: FAMILY MEDICINE

## 2018-02-08 PROCEDURE — 99213 OFFICE O/P EST LOW 20 MIN: CPT | Performed by: FAMILY MEDICINE

## 2018-02-08 PROCEDURE — 82043 UR ALBUMIN QUANTITATIVE: CPT | Performed by: FAMILY MEDICINE

## 2018-02-08 RX ORDER — METOPROLOL TARTRATE 50 MG/1
TABLET, FILM COATED ORAL
COMMUNITY
Start: 2017-12-14 | End: 2018-06-04 | Stop reason: SDUPTHER

## 2018-02-08 RX ORDER — LEVOTHYROXINE SODIUM 0.1 MG/1
1 TABLET ORAL DAILY
COMMUNITY

## 2018-02-08 RX ORDER — FENOFIBRIC ACID 135 MG/1
CAPSULE, DELAYED RELEASE ORAL
COMMUNITY
Start: 2018-01-05 | End: 2020-04-27

## 2018-02-08 RX ORDER — EZETIMIBE AND SIMVASTATIN 10; 80 MG/1; MG/1
TABLET ORAL
COMMUNITY
Start: 2018-01-05 | End: 2018-06-04 | Stop reason: SDUPTHER

## 2018-02-08 RX ORDER — MAG HYDROX/ALUMINUM HYD/SIMETH 400-400-40
SUSPENSION, ORAL (FINAL DOSE FORM) ORAL WEEKLY
COMMUNITY

## 2018-02-08 RX ORDER — LIRAGLUTIDE 6 MG/ML
INJECTION SUBCUTANEOUS
COMMUNITY
Start: 2018-01-05 | End: 2022-07-08

## 2018-02-08 RX ORDER — VALSARTAN 160 MG/1
1 TABLET ORAL DAILY
COMMUNITY
End: 2020-04-27

## 2018-02-08 RX ORDER — FUROSEMIDE 40 MG/1
20 TABLET ORAL DAILY
COMMUNITY
Start: 2017-12-14 | End: 2022-07-08

## 2018-02-08 RX ORDER — FENOFIBRIC ACID 135 MG/1
1 CAPSULE, DELAYED RELEASE ORAL DAILY
COMMUNITY
Start: 2016-11-07 | End: 2020-04-27

## 2018-02-08 NOTE — PROGRESS NOTES
Assessment/Plan:    No problem-specific Assessment & Plan notes found for this encounter  Diagnoses and all orders for this visit:    Type 2 diabetes mellitus without complication, with long-term current use of insulin ([facility])  Comments:  A1C - 7 1  Continue medications as directed  Orders:  -     POCT hemoglobin A1c  -     CBC and differential; Future  -     Comprehensive metabolic panel; Future  -     Microalbumin / creatinine urine ratio  -     Lipid Panel with Direct LDL reflex; Future  -     TSH, 3rd generation with T4 reflex; Future  -     UA w Reflex to Microscopic w Reflex to Culture -Lab Collect    Vitamin D insufficiency  -     Vitamin D 1,25 dihydroxy; Future    Other orders  -     aspirin 81 MG tablet; Take by mouth  -     Insulin Pen Needle (B-D ULTRAFINE III SHORT PEN) 31G X 8 MM MISC; by Does not apply route  -     Calcium-Vitamin D 500-125 MG-UNIT TABS; Take by mouth  -     Choline Fenofibrate (FENOFIBRIC ACID) 135 MG CPDR;   -     valsartan (DIOVAN) 160 mg tablet; Take 1 tablet by mouth daily  -     ezetimibe-simvastatin (VYTORIN) 10-80 MG per tablet;   -     Choline Fenofibrate (FENOFIBRIC ACID) 135 MG CPDR; Take 1 capsule by mouth daily  -     glucose blood (FREESTYLE LITE) test strip; by In Vitro route 4 (four) times a day  -     furosemide (LASIX) 40 mg tablet;   -     insulin lispro (HUMALOG) 100 units/mL injection; Inject under the skin  -     levothyroxine 100 mcg tablet; Take 1 tablet by mouth daily  -     VICTOZA 18 MG/3ML SOPN;   -     metoprolol tartrate (LOPRESSOR) 50 mg tablet;   -     Omega-3 Fatty Acids (OMEGA-3 EPA FISH OIL) 1205 MG CAPS; Take by mouth  -     Soy Isoflavone 40 MG TABS; Take by mouth  -     Cholecalciferol (VITAMIN D3) 5000 units CAPS; Take by mouth  -     B Complex-Folic Acid (SUPER B COMPLEX MAXI) TABS; Take by mouth          Subjective:      Patient ID: Holger Acevedo is a 70 y o  female  Patient presents for diabetic follow up   Patient doing well, no complaints/concerns  Patient eating/drinking/urinating/stooling/sleeping good  Patient states she monitors BP at home, runs 117/60 normally  Patient UTD on eye exam, does not have podiatrist  Patient checks feet at home  The following portions of the patient's history were reviewed and updated as appropriate: allergies, current medications, past family history, past medical history, past social history, past surgical history and problem list     Review of Systems   Constitutional: Negative  Eyes: Negative  Respiratory: Negative  Cardiovascular: Negative  Gastrointestinal: Negative  Genitourinary: Negative  Musculoskeletal: Negative  Neurological: Negative  Psychiatric/Behavioral: Negative  Objective:    Vitals:    02/08/18 1007   BP: 136/88   Pulse: 76   Resp: 16   Temp: (!) 96 5 °F (35 8 °C)   SpO2: 98%        Physical Exam   Constitutional: She is oriented to person, place, and time  She appears well-developed and well-nourished  HENT:   Head: Normocephalic and atraumatic  Right Ear: Tympanic membrane and ear canal normal    Left Ear: Tympanic membrane, external ear and ear canal normal    Nose: Nose normal    Mouth/Throat: Uvula is midline, oropharynx is clear and moist and mucous membranes are normal    Eyes: Conjunctivae are normal  Pupils are equal, round, and reactive to light  Neck: Neck supple  Cardiovascular: Normal rate, regular rhythm and normal heart sounds  Pulmonary/Chest: Effort normal and breath sounds normal    Abdominal: Soft  Bowel sounds are normal    Neurological: She is alert and oriented to person, place, and time  Skin: Skin is warm and dry  Psychiatric: She has a normal mood and affect   Her behavior is normal

## 2018-02-22 ENCOUNTER — TRANSCRIBE ORDERS (OUTPATIENT)
Dept: LAB | Facility: MEDICAL CENTER | Age: 68
End: 2018-02-22

## 2018-02-22 ENCOUNTER — LAB (OUTPATIENT)
Dept: LAB | Facility: MEDICAL CENTER | Age: 68
End: 2018-02-22
Payer: MEDICARE

## 2018-02-22 DIAGNOSIS — E55.9 VITAMIN D INSUFFICIENCY: ICD-10-CM

## 2018-02-22 DIAGNOSIS — E11.21 DIABETIC GLOMERULOPATHY (HCC): ICD-10-CM

## 2018-02-22 DIAGNOSIS — Z79.4 TYPE 2 DIABETES MELLITUS WITHOUT COMPLICATION, WITH LONG-TERM CURRENT USE OF INSULIN (HCC): ICD-10-CM

## 2018-02-22 DIAGNOSIS — N18.30 CHRONIC KIDNEY DISEASE, STAGE III (MODERATE) (HCC): ICD-10-CM

## 2018-02-22 DIAGNOSIS — N18.30 CHRONIC KIDNEY DISEASE, STAGE III (MODERATE) (HCC): Primary | ICD-10-CM

## 2018-02-22 DIAGNOSIS — E11.9 TYPE 2 DIABETES MELLITUS WITHOUT COMPLICATION, WITH LONG-TERM CURRENT USE OF INSULIN (HCC): ICD-10-CM

## 2018-02-22 LAB
ALBUMIN SERPL BCP-MCNC: 3.6 G/DL (ref 3.5–5)
ALP SERPL-CCNC: 59 U/L (ref 46–116)
ALT SERPL W P-5'-P-CCNC: 41 U/L (ref 12–78)
ANION GAP SERPL CALCULATED.3IONS-SCNC: 6 MMOL/L (ref 4–13)
AST SERPL W P-5'-P-CCNC: 31 U/L (ref 5–45)
BACTERIA UR QL AUTO: ABNORMAL /HPF
BASOPHILS # BLD AUTO: 0.02 THOUSANDS/ΜL (ref 0–0.1)
BASOPHILS NFR BLD AUTO: 0 % (ref 0–1)
BILIRUB SERPL-MCNC: 0.43 MG/DL (ref 0.2–1)
BILIRUB UR QL STRIP: NEGATIVE
BUN SERPL-MCNC: 34 MG/DL (ref 5–25)
CALCIUM SERPL-MCNC: 9.4 MG/DL (ref 8.3–10.1)
CHLORIDE SERPL-SCNC: 103 MMOL/L (ref 100–108)
CHOLEST SERPL-MCNC: 188 MG/DL (ref 50–200)
CLARITY UR: ABNORMAL
CO2 SERPL-SCNC: 28 MMOL/L (ref 21–32)
COLOR UR: YELLOW
CREAT SERPL-MCNC: 1.42 MG/DL (ref 0.6–1.3)
CREAT UR-MCNC: 116 MG/DL
EOSINOPHIL # BLD AUTO: 0.16 THOUSAND/ΜL (ref 0–0.61)
EOSINOPHIL NFR BLD AUTO: 2 % (ref 0–6)
ERYTHROCYTE [DISTWIDTH] IN BLOOD BY AUTOMATED COUNT: 12.5 % (ref 11.6–15.1)
GFR SERPL CREATININE-BSD FRML MDRD: 38 ML/MIN/1.73SQ M
GLUCOSE P FAST SERPL-MCNC: 141 MG/DL (ref 65–99)
GLUCOSE UR STRIP-MCNC: NEGATIVE MG/DL
HCT VFR BLD AUTO: 37.2 % (ref 34.8–46.1)
HDLC SERPL-MCNC: 35 MG/DL (ref 40–60)
HGB BLD-MCNC: 12.7 G/DL (ref 11.5–15.4)
HGB UR QL STRIP.AUTO: NEGATIVE
KETONES UR STRIP-MCNC: NEGATIVE MG/DL
LDLC SERPL CALC-MCNC: 97 MG/DL (ref 0–100)
LEUKOCYTE ESTERASE UR QL STRIP: ABNORMAL
LYMPHOCYTES # BLD AUTO: 3.12 THOUSANDS/ΜL (ref 0.6–4.47)
LYMPHOCYTES NFR BLD AUTO: 47 % (ref 14–44)
MCH RBC QN AUTO: 32.7 PG (ref 26.8–34.3)
MCHC RBC AUTO-ENTMCNC: 34.1 G/DL (ref 31.4–37.4)
MCV RBC AUTO: 96 FL (ref 82–98)
MICROALBUMIN UR-MCNC: 67.4 MG/L (ref 0–20)
MICROALBUMIN/CREAT 24H UR: 58 MG/G CREATININE (ref 0–30)
MONOCYTES # BLD AUTO: 0.47 THOUSAND/ΜL (ref 0.17–1.22)
MONOCYTES NFR BLD AUTO: 7 % (ref 4–12)
NEUTROPHILS # BLD AUTO: 3.01 THOUSANDS/ΜL (ref 1.85–7.62)
NEUTS SEG NFR BLD AUTO: 44 % (ref 43–75)
NITRITE UR QL STRIP: NEGATIVE
NON-SQ EPI CELLS URNS QL MICRO: ABNORMAL /HPF
NRBC BLD AUTO-RTO: 0 /100 WBCS
PH UR STRIP.AUTO: 6 [PH] (ref 4.5–8)
PLATELET # BLD AUTO: 168 THOUSANDS/UL (ref 149–390)
PMV BLD AUTO: 12.2 FL (ref 8.9–12.7)
POTASSIUM SERPL-SCNC: 4 MMOL/L (ref 3.5–5.3)
PROT SERPL-MCNC: 7.4 G/DL (ref 6.4–8.2)
PROT UR STRIP-MCNC: ABNORMAL MG/DL
RBC # BLD AUTO: 3.88 MILLION/UL (ref 3.81–5.12)
RBC #/AREA URNS AUTO: ABNORMAL /HPF
SODIUM SERPL-SCNC: 137 MMOL/L (ref 136–145)
SP GR UR STRIP.AUTO: 1.02 (ref 1–1.03)
TRIGL SERPL-MCNC: 281 MG/DL
TSH SERPL DL<=0.05 MIU/L-ACNC: 1.34 UIU/ML (ref 0.36–3.74)
UROBILINOGEN UR QL STRIP.AUTO: 0.2 E.U./DL
WBC # BLD AUTO: 6.79 THOUSAND/UL (ref 4.31–10.16)
WBC #/AREA URNS AUTO: ABNORMAL /HPF

## 2018-02-22 PROCEDURE — 80061 LIPID PANEL: CPT

## 2018-02-22 PROCEDURE — 85025 COMPLETE CBC W/AUTO DIFF WBC: CPT

## 2018-02-22 PROCEDURE — 36415 COLL VENOUS BLD VENIPUNCTURE: CPT

## 2018-02-22 PROCEDURE — 80053 COMPREHEN METABOLIC PANEL: CPT

## 2018-02-22 PROCEDURE — 84443 ASSAY THYROID STIM HORMONE: CPT

## 2018-02-22 PROCEDURE — 82652 VIT D 1 25-DIHYDROXY: CPT

## 2018-02-27 LAB — 1,25(OH)2D3 SERPL-MCNC: 52.2 PG/ML (ref 19.9–79.3)

## 2018-05-09 ENCOUNTER — OFFICE VISIT (OUTPATIENT)
Dept: FAMILY MEDICINE CLINIC | Facility: CLINIC | Age: 68
End: 2018-05-09
Payer: MEDICARE

## 2018-05-09 VITALS
HEIGHT: 64 IN | SYSTOLIC BLOOD PRESSURE: 131 MMHG | OXYGEN SATURATION: 96 % | HEART RATE: 58 BPM | TEMPERATURE: 97.4 F | BODY MASS INDEX: 37.9 KG/M2 | DIASTOLIC BLOOD PRESSURE: 82 MMHG | WEIGHT: 222 LBS

## 2018-05-09 DIAGNOSIS — E11.22 TYPE 2 DIABETES MELLITUS WITH CHRONIC KIDNEY DISEASE, WITH LONG-TERM CURRENT USE OF INSULIN, UNSPECIFIED CKD STAGE (HCC): Primary | ICD-10-CM

## 2018-05-09 DIAGNOSIS — E03.9 HYPOTHYROIDISM, UNSPECIFIED TYPE: ICD-10-CM

## 2018-05-09 DIAGNOSIS — E55.9 VITAMIN D INSUFFICIENCY: ICD-10-CM

## 2018-05-09 DIAGNOSIS — Z79.4 TYPE 2 DIABETES MELLITUS WITH CHRONIC KIDNEY DISEASE, WITH LONG-TERM CURRENT USE OF INSULIN, UNSPECIFIED CKD STAGE (HCC): Primary | ICD-10-CM

## 2018-05-09 DIAGNOSIS — Z11.59 NEED FOR HEPATITIS C SCREENING TEST: ICD-10-CM

## 2018-05-09 DIAGNOSIS — E78.5 HYPERLIPIDEMIA, UNSPECIFIED HYPERLIPIDEMIA TYPE: ICD-10-CM

## 2018-05-09 LAB — SL AMB POCT HEMOGLOBIN AIC: 7.8

## 2018-05-09 PROCEDURE — 99213 OFFICE O/P EST LOW 20 MIN: CPT | Performed by: FAMILY MEDICINE

## 2018-05-09 PROCEDURE — 83036 HEMOGLOBIN GLYCOSYLATED A1C: CPT | Performed by: FAMILY MEDICINE

## 2018-05-09 NOTE — PROGRESS NOTES
Assessment/Plan:     Diagnoses and all orders for this visit:    Type 2 diabetes mellitus with chronic kidney disease, with long-term current use of insulin, unspecified CKD stage (HCC)  -     POCT hemoglobin A1c  -     CBC and differential; Future  -     Comprehensive metabolic panel; Future  -     Microalbumin / creatinine urine ratio  -     Ambulatory referral to Endocrinology; Future    Hypothyroidism, unspecified type  -     TSH, 3rd generation with T4 reflex; Future    Hyperlipidemia, unspecified hyperlipidemia type  -     Lipid Panel with Direct LDL reflex; Future    Vitamin D insufficiency  -     Vitamin D 1,25 dihydroxy; Future    Need for hepatitis C screening test  -     Hepatitis C antibody; Future      Discussion/Plan:   Type 2 Diabetes - A1C 7 8 today, up from 7 1 in 2/18  Pt did have steroid injections in knee and has been more stressed  Referral to endocrinology given  Eye exam UTD  Foot exam at next f/u  Improve diet and exercise  Continue medications as directed  Routine labs ordered for patient to have done before next f/u  Mammo - UTD  Colonoscopy - UTD  F/U in 3 months or sooner if needed  Subjective:      Patient ID: Willie Lugo is a 70 y o  female  Chief Complaint   Patient presents with    Follow-up    Diabetes       Patient presents to the office today for diabetic f/u  Patient is on insulin pump therapy and has not seen endocrinology  Patient's A1C up today, 7 8 from 7 1  Patient reports she has arthritis in knees and had right knee fluid drained and has steroid shot, which she states puts her sugars up  Patient denies any diet and exercise changes  She reports more stress lately because her  had back surgery           The following portions of the patient's history were reviewed and updated as appropriate: allergies, current medications, past family history, past medical history, past social history, past surgical history and problem list     Patient Active Problem List   Diagnosis    [facility]    Hyperlipidemia    Hypothyroidism    Kidney disease    Left breast lump    Type 2 diabetes mellitus with chronic kidney disease, with long-term current use of insulin ([facility] )    Vitamin D insufficiency     Current Outpatient Prescriptions on File Prior to Visit   Medication Sig Dispense Refill    aspirin 81 MG tablet Take by mouth      B Complex-Folic Acid (SUPER B COMPLEX MAXI) TABS Take by mouth      Calcium-Vitamin D 500-125 MG-UNIT TABS Take by mouth      Cholecalciferol (VITAMIN D3) 5000 units CAPS Take by mouth      Choline Fenofibrate (FENOFIBRIC ACID) 135 MG CPDR       Choline Fenofibrate (FENOFIBRIC ACID) 135 MG CPDR Take 1 capsule by mouth daily      ezetimibe-simvastatin (VYTORIN) 10-80 MG per tablet       furosemide (LASIX) 40 mg tablet       glucose blood (FREESTYLE LITE) test strip by In Vitro route 4 (four) times a day      insulin lispro (HUMALOG) 100 units/mL injection Inject under the skin      Insulin Pen Needle (B-D ULTRAFINE III SHORT PEN) 31G X 8 MM MISC by Does not apply route      levothyroxine 100 mcg tablet Take 1 tablet by mouth daily      metoprolol tartrate (LOPRESSOR) 50 mg tablet       Omega-3 Fatty Acids (OMEGA-3 EPA FISH OIL) 1205 MG CAPS Take by mouth      Soy Isoflavone 40 MG TABS Take by mouth      valsartan (DIOVAN) 160 mg tablet Take 1 tablet by mouth daily      VICTOZA 18 MG/3ML SOPN        No current facility-administered medications on file prior to visit  Review of Systems   Constitutional: Negative  HENT: Negative  Eyes: Negative  Respiratory: Negative  Cardiovascular: Negative  Gastrointestinal: Negative  Genitourinary: Negative  Neurological: Negative  Psychiatric/Behavioral: Negative            Objective:      /82 (BP Location: Left arm, Patient Position: Sitting, Cuff Size: Standard)   Pulse 58   Temp (!) 97 4 °F (36 3 °C) (Tympanic)   Ht 5' 4" (1 626 m)   Wt 101 kg (222 lb)   SpO2 96%   BMI 38 11 kg/m²          Physical Exam   Constitutional: She is oriented to person, place, and time  She appears well-developed and well-nourished  obese   HENT:   Head: Normocephalic and atraumatic  Right Ear: External ear normal    Left Ear: External ear normal    Nose: Nose normal    Mouth/Throat: Oropharynx is clear and moist    Eyes: Conjunctivae are normal  Pupils are equal, round, and reactive to light  Neck: Neck supple  No thyromegaly present  Cardiovascular: Normal rate, regular rhythm and intact distal pulses  Pulmonary/Chest: Effort normal and breath sounds normal    Abdominal: Soft  Bowel sounds are normal    Musculoskeletal: She exhibits no edema  Neurological: She is alert and oriented to person, place, and time  Skin: Skin is warm and dry  Psychiatric: She has a normal mood and affect   Her behavior is normal

## 2018-05-30 DIAGNOSIS — E11.8 TYPE 2 DIABETES MELLITUS WITH COMPLICATION, WITH LONG-TERM CURRENT USE OF INSULIN (HCC): Primary | ICD-10-CM

## 2018-05-30 DIAGNOSIS — Z79.4 TYPE 2 DIABETES MELLITUS WITH COMPLICATION, WITH LONG-TERM CURRENT USE OF INSULIN (HCC): Primary | ICD-10-CM

## 2018-06-04 DIAGNOSIS — I10 HYPERTENSION, UNSPECIFIED TYPE: Primary | ICD-10-CM

## 2018-06-04 RX ORDER — EZETIMIBE AND SIMVASTATIN 10; 80 MG/1; MG/1
1 TABLET ORAL EVERY EVENING
Qty: 90 TABLET | Refills: 2 | Status: SHIPPED | OUTPATIENT
Start: 2018-06-04 | End: 2022-07-08

## 2018-06-04 RX ORDER — METOPROLOL TARTRATE 50 MG/1
25 TABLET, FILM COATED ORAL 2 TIMES DAILY
Qty: 180 TABLET | Refills: 2 | Status: SHIPPED | OUTPATIENT
Start: 2018-06-04 | End: 2021-10-25

## 2018-09-05 ENCOUNTER — APPOINTMENT (OUTPATIENT)
Dept: LAB | Facility: MEDICAL CENTER | Age: 68
End: 2018-09-05
Payer: MEDICARE

## 2018-09-05 ENCOUNTER — TRANSCRIBE ORDERS (OUTPATIENT)
Dept: ADMINISTRATIVE | Facility: HOSPITAL | Age: 68
End: 2018-09-05

## 2018-09-05 DIAGNOSIS — N25.81 SECONDARY HYPERPARATHYROIDISM OF RENAL ORIGIN (HCC): ICD-10-CM

## 2018-09-05 DIAGNOSIS — E03.9 HYPOTHYROIDISM, UNSPECIFIED TYPE: ICD-10-CM

## 2018-09-05 DIAGNOSIS — R80.9 PROTEINURIA, UNSPECIFIED TYPE: ICD-10-CM

## 2018-09-05 DIAGNOSIS — N18.30 CHRONIC KIDNEY DISEASE, STAGE III (MODERATE) (HCC): ICD-10-CM

## 2018-09-05 DIAGNOSIS — E78.5 HYPERLIPIDEMIA, UNSPECIFIED HYPERLIPIDEMIA TYPE: ICD-10-CM

## 2018-09-05 DIAGNOSIS — E11.29 UNCONTROLLED TYPE 2 DIABETES MELLITUS WITH OTHER DIABETIC KIDNEY COMPLICATION, UNSPECIFIED WHETHER LONG TERM INSULIN USE: ICD-10-CM

## 2018-09-05 DIAGNOSIS — E11.65 UNCONTROLLED TYPE 2 DIABETES MELLITUS WITH OTHER DIABETIC KIDNEY COMPLICATION, UNSPECIFIED WHETHER LONG TERM INSULIN USE: ICD-10-CM

## 2018-09-05 DIAGNOSIS — E78.5 HYPERLIPIDEMIA, UNSPECIFIED HYPERLIPIDEMIA TYPE: Primary | ICD-10-CM

## 2018-09-05 LAB
ALBUMIN SERPL BCP-MCNC: 3.3 G/DL (ref 3.5–5)
ALP SERPL-CCNC: 50 U/L (ref 46–116)
ALT SERPL W P-5'-P-CCNC: 45 U/L (ref 12–78)
ANION GAP SERPL CALCULATED.3IONS-SCNC: 8 MMOL/L (ref 4–13)
AST SERPL W P-5'-P-CCNC: 32 U/L (ref 5–45)
BACTERIA UR QL AUTO: ABNORMAL /HPF
BILIRUB SERPL-MCNC: 0.57 MG/DL (ref 0.2–1)
BILIRUB UR QL STRIP: NEGATIVE
BUN SERPL-MCNC: 35 MG/DL (ref 5–25)
CALCIUM SERPL-MCNC: 9 MG/DL (ref 8.3–10.1)
CHLORIDE SERPL-SCNC: 103 MMOL/L (ref 100–108)
CHOLEST SERPL-MCNC: 170 MG/DL (ref 50–200)
CLARITY UR: ABNORMAL
CO2 SERPL-SCNC: 27 MMOL/L (ref 21–32)
COLOR UR: YELLOW
CREAT SERPL-MCNC: 1.51 MG/DL (ref 0.6–1.3)
CREAT UR-MCNC: 90.1 MG/DL
ERYTHROCYTE [DISTWIDTH] IN BLOOD BY AUTOMATED COUNT: 12.6 % (ref 11.6–15.1)
GFR SERPL CREATININE-BSD FRML MDRD: 35 ML/MIN/1.73SQ M
GLUCOSE P FAST SERPL-MCNC: 116 MG/DL (ref 65–99)
GLUCOSE UR STRIP-MCNC: NEGATIVE MG/DL
HCT VFR BLD AUTO: 34.5 % (ref 34.8–46.1)
HDLC SERPL-MCNC: 34 MG/DL (ref 40–60)
HGB BLD-MCNC: 12.1 G/DL (ref 11.5–15.4)
HGB UR QL STRIP.AUTO: NEGATIVE
HYALINE CASTS #/AREA URNS LPF: ABNORMAL /LPF
KETONES UR STRIP-MCNC: NEGATIVE MG/DL
LDLC SERPL CALC-MCNC: 90 MG/DL (ref 0–100)
LEUKOCYTE ESTERASE UR QL STRIP: ABNORMAL
MAGNESIUM SERPL-MCNC: 2 MG/DL (ref 1.6–2.6)
MCH RBC QN AUTO: 34.4 PG (ref 26.8–34.3)
MCHC RBC AUTO-ENTMCNC: 35.1 G/DL (ref 31.4–37.4)
MCV RBC AUTO: 98 FL (ref 82–98)
MICROALBUMIN UR-MCNC: 38.6 MG/L (ref 0–20)
MICROALBUMIN/CREAT 24H UR: 43 MG/G CREATININE (ref 0–30)
NITRITE UR QL STRIP: NEGATIVE
NON-SQ EPI CELLS URNS QL MICRO: ABNORMAL /HPF
NONHDLC SERPL-MCNC: 136 MG/DL
PH UR STRIP.AUTO: 6 [PH] (ref 4.5–8)
PLATELET # BLD AUTO: 146 THOUSANDS/UL (ref 149–390)
PMV BLD AUTO: 13.1 FL (ref 8.9–12.7)
POTASSIUM SERPL-SCNC: 3.9 MMOL/L (ref 3.5–5.3)
PROT SERPL-MCNC: 7.3 G/DL (ref 6.4–8.2)
PROT UR STRIP-MCNC: NEGATIVE MG/DL
PTH-INTACT SERPL-MCNC: 21.5 PG/ML (ref 18.4–80.1)
RBC # BLD AUTO: 3.52 MILLION/UL (ref 3.81–5.12)
RBC #/AREA URNS AUTO: ABNORMAL /HPF
SODIUM SERPL-SCNC: 138 MMOL/L (ref 136–145)
SP GR UR STRIP.AUTO: 1.02 (ref 1–1.03)
TRIGL SERPL-MCNC: 230 MG/DL
TSH SERPL DL<=0.05 MIU/L-ACNC: 0.91 UIU/ML (ref 0.36–3.74)
UROBILINOGEN UR QL STRIP.AUTO: 0.2 E.U./DL
WBC # BLD AUTO: 6.35 THOUSAND/UL (ref 4.31–10.16)
WBC #/AREA URNS AUTO: ABNORMAL /HPF

## 2018-09-05 PROCEDURE — 80053 COMPREHEN METABOLIC PANEL: CPT

## 2018-09-05 PROCEDURE — 82043 UR ALBUMIN QUANTITATIVE: CPT | Performed by: PHYSICIAN ASSISTANT

## 2018-09-05 PROCEDURE — 83735 ASSAY OF MAGNESIUM: CPT

## 2018-09-05 PROCEDURE — 80061 LIPID PANEL: CPT

## 2018-09-05 PROCEDURE — 81001 URINALYSIS AUTO W/SCOPE: CPT | Performed by: PHYSICIAN ASSISTANT

## 2018-09-05 PROCEDURE — 36415 COLL VENOUS BLD VENIPUNCTURE: CPT

## 2018-09-05 PROCEDURE — 84443 ASSAY THYROID STIM HORMONE: CPT

## 2018-09-05 PROCEDURE — 85027 COMPLETE CBC AUTOMATED: CPT

## 2018-09-05 PROCEDURE — 83970 ASSAY OF PARATHORMONE: CPT

## 2018-09-05 PROCEDURE — 82570 ASSAY OF URINE CREATININE: CPT | Performed by: PHYSICIAN ASSISTANT

## 2018-12-27 ENCOUNTER — APPOINTMENT (OUTPATIENT)
Dept: LAB | Facility: MEDICAL CENTER | Age: 68
End: 2018-12-27
Payer: MEDICARE

## 2018-12-27 ENCOUNTER — TRANSCRIBE ORDERS (OUTPATIENT)
Dept: RADIOLOGY | Facility: MEDICAL CENTER | Age: 68
End: 2018-12-27

## 2018-12-27 DIAGNOSIS — N18.30 CHRONIC KIDNEY DISEASE, STAGE III (MODERATE) (HCC): ICD-10-CM

## 2018-12-27 DIAGNOSIS — E11.29 TYPE 2 DIABETES MELLITUS WITH OTHER DIABETIC KIDNEY COMPLICATION (HCC): ICD-10-CM

## 2018-12-27 DIAGNOSIS — E03.9 HYPOTHYROIDISM, UNSPECIFIED TYPE: Primary | ICD-10-CM

## 2018-12-27 DIAGNOSIS — E78.5 HYPERLIPIDEMIA, UNSPECIFIED HYPERLIPIDEMIA TYPE: ICD-10-CM

## 2018-12-27 DIAGNOSIS — E11.21 DIABETIC GLOMERULOPATHY (HCC): ICD-10-CM

## 2018-12-27 DIAGNOSIS — E03.9 HYPOTHYROIDISM, UNSPECIFIED TYPE: ICD-10-CM

## 2018-12-27 LAB
ANION GAP SERPL CALCULATED.3IONS-SCNC: 5 MMOL/L (ref 4–13)
BUN SERPL-MCNC: 35 MG/DL (ref 5–25)
CALCIUM SERPL-MCNC: 8.7 MG/DL (ref 8.3–10.1)
CHLORIDE SERPL-SCNC: 106 MMOL/L (ref 100–108)
CHOLEST SERPL-MCNC: 179 MG/DL (ref 50–200)
CO2 SERPL-SCNC: 27 MMOL/L (ref 21–32)
CREAT SERPL-MCNC: 1.39 MG/DL (ref 0.6–1.3)
CREAT UR-MCNC: 103 MG/DL
ERYTHROCYTE [DISTWIDTH] IN BLOOD BY AUTOMATED COUNT: 12.7 % (ref 11.6–15.1)
ERYTHROCYTE [SEDIMENTATION RATE] IN BLOOD: 38 MM/HOUR (ref 0–20)
GFR SERPL CREATININE-BSD FRML MDRD: 39 ML/MIN/1.73SQ M
GLUCOSE P FAST SERPL-MCNC: 97 MG/DL (ref 65–99)
HCT VFR BLD AUTO: 36.5 % (ref 34.8–46.1)
HDLC SERPL-MCNC: 34 MG/DL (ref 40–60)
HGB BLD-MCNC: 12.1 G/DL (ref 11.5–15.4)
LDLC SERPL CALC-MCNC: 99 MG/DL (ref 0–100)
MCH RBC QN AUTO: 33.2 PG (ref 26.8–34.3)
MCHC RBC AUTO-ENTMCNC: 33.2 G/DL (ref 31.4–37.4)
MCV RBC AUTO: 100 FL (ref 82–98)
MICROALBUMIN UR-MCNC: 53.9 MG/L (ref 0–20)
MICROALBUMIN/CREAT 24H UR: 52 MG/G CREATININE (ref 0–30)
NONHDLC SERPL-MCNC: 145 MG/DL
PLATELET # BLD AUTO: 172 THOUSANDS/UL (ref 149–390)
PMV BLD AUTO: 12.6 FL (ref 8.9–12.7)
POTASSIUM SERPL-SCNC: 4 MMOL/L (ref 3.5–5.3)
RBC # BLD AUTO: 3.65 MILLION/UL (ref 3.81–5.12)
SODIUM SERPL-SCNC: 138 MMOL/L (ref 136–145)
TRIGL SERPL-MCNC: 231 MG/DL
TSH SERPL DL<=0.05 MIU/L-ACNC: 0.79 UIU/ML (ref 0.36–3.74)
WBC # BLD AUTO: 6.82 THOUSAND/UL (ref 4.31–10.16)

## 2018-12-27 PROCEDURE — 82570 ASSAY OF URINE CREATININE: CPT | Performed by: FAMILY MEDICINE

## 2018-12-27 PROCEDURE — 84443 ASSAY THYROID STIM HORMONE: CPT

## 2018-12-27 PROCEDURE — 36415 COLL VENOUS BLD VENIPUNCTURE: CPT

## 2018-12-27 PROCEDURE — 85027 COMPLETE CBC AUTOMATED: CPT

## 2018-12-27 PROCEDURE — 86430 RHEUMATOID FACTOR TEST QUAL: CPT

## 2018-12-27 PROCEDURE — 82043 UR ALBUMIN QUANTITATIVE: CPT | Performed by: FAMILY MEDICINE

## 2018-12-27 PROCEDURE — 85652 RBC SED RATE AUTOMATED: CPT

## 2018-12-27 PROCEDURE — 80061 LIPID PANEL: CPT

## 2018-12-27 PROCEDURE — 80048 BASIC METABOLIC PNL TOTAL CA: CPT

## 2018-12-28 LAB — RHEUMATOID FACT SER QL LA: NEGATIVE

## 2019-05-07 ENCOUNTER — APPOINTMENT (OUTPATIENT)
Dept: LAB | Facility: MEDICAL CENTER | Age: 69
End: 2019-05-07
Payer: MEDICARE

## 2019-05-07 ENCOUNTER — TRANSCRIBE ORDERS (OUTPATIENT)
Dept: ADMINISTRATIVE | Facility: HOSPITAL | Age: 69
End: 2019-05-07

## 2019-05-07 DIAGNOSIS — E11.21 DIABETIC GLOMERULOPATHY (HCC): ICD-10-CM

## 2019-05-07 DIAGNOSIS — E78.5 HYPERLIPIDEMIA, UNSPECIFIED HYPERLIPIDEMIA TYPE: ICD-10-CM

## 2019-05-07 DIAGNOSIS — N18.30 CHRONIC KIDNEY DISEASE, STAGE III (MODERATE) (HCC): Primary | ICD-10-CM

## 2019-05-07 DIAGNOSIS — N18.30 CHRONIC KIDNEY DISEASE, STAGE III (MODERATE) (HCC): ICD-10-CM

## 2019-05-07 LAB
ANION GAP SERPL CALCULATED.3IONS-SCNC: 6 MMOL/L (ref 4–13)
BUN SERPL-MCNC: 33 MG/DL (ref 5–25)
CALCIUM SERPL-MCNC: 9.3 MG/DL (ref 8.3–10.1)
CHLORIDE SERPL-SCNC: 106 MMOL/L (ref 100–108)
CO2 SERPL-SCNC: 28 MMOL/L (ref 21–32)
CREAT SERPL-MCNC: 1.54 MG/DL (ref 0.6–1.3)
CREAT UR-MCNC: 109 MG/DL
GFR SERPL CREATININE-BSD FRML MDRD: 34 ML/MIN/1.73SQ M
GLUCOSE P FAST SERPL-MCNC: 117 MG/DL (ref 65–99)
MICROALBUMIN UR-MCNC: 118 MG/L (ref 0–20)
MICROALBUMIN/CREAT 24H UR: 108 MG/G CREATININE (ref 0–30)
POTASSIUM SERPL-SCNC: 4 MMOL/L (ref 3.5–5.3)
SODIUM SERPL-SCNC: 140 MMOL/L (ref 136–145)
TRIGL SERPL-MCNC: 309 MG/DL

## 2019-05-07 PROCEDURE — 36415 COLL VENOUS BLD VENIPUNCTURE: CPT

## 2019-05-07 PROCEDURE — 84478 ASSAY OF TRIGLYCERIDES: CPT

## 2019-05-07 PROCEDURE — 82570 ASSAY OF URINE CREATININE: CPT | Performed by: INTERNAL MEDICINE

## 2019-05-07 PROCEDURE — 80048 BASIC METABOLIC PNL TOTAL CA: CPT

## 2019-05-07 PROCEDURE — 82043 UR ALBUMIN QUANTITATIVE: CPT | Performed by: INTERNAL MEDICINE

## 2019-09-10 ENCOUNTER — APPOINTMENT (OUTPATIENT)
Dept: LAB | Facility: MEDICAL CENTER | Age: 69
End: 2019-09-10
Payer: MEDICARE

## 2019-09-10 ENCOUNTER — TRANSCRIBE ORDERS (OUTPATIENT)
Dept: ADMINISTRATIVE | Facility: HOSPITAL | Age: 69
End: 2019-09-10

## 2019-09-10 DIAGNOSIS — E55.9 VITAMIN D DEFICIENCY DISEASE: ICD-10-CM

## 2019-09-10 DIAGNOSIS — N18.30 CHRONIC KIDNEY DISEASE, STAGE III (MODERATE) (HCC): Primary | ICD-10-CM

## 2019-09-10 DIAGNOSIS — E11.21 DIABETIC GLOMERULOPATHY (HCC): ICD-10-CM

## 2019-09-10 DIAGNOSIS — N18.30 CHRONIC KIDNEY DISEASE, STAGE III (MODERATE) (HCC): ICD-10-CM

## 2019-09-10 DIAGNOSIS — E78.5 HYPERLIPIDEMIA, UNSPECIFIED HYPERLIPIDEMIA TYPE: ICD-10-CM

## 2019-09-10 DIAGNOSIS — N25.81 SECONDARY HYPERPARATHYROIDISM OF RENAL ORIGIN (HCC): ICD-10-CM

## 2019-09-10 LAB
25(OH)D3 SERPL-MCNC: 60.1 NG/ML (ref 30–100)
ANION GAP SERPL CALCULATED.3IONS-SCNC: 5 MMOL/L (ref 4–13)
BACTERIA UR QL AUTO: ABNORMAL /HPF
BILIRUB UR QL STRIP: NEGATIVE
BUN SERPL-MCNC: 30 MG/DL (ref 5–25)
CALCIUM SERPL-MCNC: 9.5 MG/DL (ref 8.3–10.1)
CAOX CRY URNS QL MICRO: ABNORMAL /HPF
CHLORIDE SERPL-SCNC: 102 MMOL/L (ref 100–108)
CLARITY UR: ABNORMAL
CO2 SERPL-SCNC: 28 MMOL/L (ref 21–32)
COLOR UR: YELLOW
CREAT SERPL-MCNC: 1.39 MG/DL (ref 0.6–1.3)
CREAT UR-MCNC: 200 MG/DL
GFR SERPL CREATININE-BSD FRML MDRD: 38 ML/MIN/1.73SQ M
GLUCOSE P FAST SERPL-MCNC: 140 MG/DL (ref 65–99)
GLUCOSE UR STRIP-MCNC: NEGATIVE MG/DL
HGB UR QL STRIP.AUTO: NEGATIVE
KETONES UR STRIP-MCNC: NEGATIVE MG/DL
LEUKOCYTE ESTERASE UR QL STRIP: NEGATIVE
MICROALBUMIN UR-MCNC: 96.6 MG/L (ref 0–20)
MICROALBUMIN/CREAT 24H UR: 48 MG/G CREATININE (ref 0–30)
NITRITE UR QL STRIP: NEGATIVE
NON-SQ EPI CELLS URNS QL MICRO: ABNORMAL /HPF
PH UR STRIP.AUTO: 5.5 [PH]
POTASSIUM SERPL-SCNC: 3.7 MMOL/L (ref 3.5–5.3)
PROT UR STRIP-MCNC: ABNORMAL MG/DL
PTH-INTACT SERPL-MCNC: 26.2 PG/ML (ref 18.4–80.1)
RBC #/AREA URNS AUTO: ABNORMAL /HPF
SODIUM SERPL-SCNC: 135 MMOL/L (ref 136–145)
SP GR UR STRIP.AUTO: 1.02 (ref 1–1.03)
TRIGL SERPL-MCNC: 250 MG/DL
UROBILINOGEN UR QL STRIP.AUTO: 0.2 E.U./DL
WBC #/AREA URNS AUTO: ABNORMAL /HPF

## 2019-09-10 PROCEDURE — 82306 VITAMIN D 25 HYDROXY: CPT

## 2019-09-10 PROCEDURE — 81001 URINALYSIS AUTO W/SCOPE: CPT | Performed by: INTERNAL MEDICINE

## 2019-09-10 PROCEDURE — 82043 UR ALBUMIN QUANTITATIVE: CPT | Performed by: INTERNAL MEDICINE

## 2019-09-10 PROCEDURE — 83970 ASSAY OF PARATHORMONE: CPT

## 2019-09-10 PROCEDURE — 82570 ASSAY OF URINE CREATININE: CPT | Performed by: INTERNAL MEDICINE

## 2019-09-10 PROCEDURE — 36415 COLL VENOUS BLD VENIPUNCTURE: CPT

## 2019-09-10 PROCEDURE — 84478 ASSAY OF TRIGLYCERIDES: CPT

## 2019-09-10 PROCEDURE — 80048 BASIC METABOLIC PNL TOTAL CA: CPT

## 2019-09-13 ENCOUNTER — OFFICE VISIT (OUTPATIENT)
Dept: NEPHROLOGY | Facility: CLINIC | Age: 69
End: 2019-09-13
Payer: MEDICARE

## 2019-09-13 VITALS
WEIGHT: 222.8 LBS | HEIGHT: 64 IN | DIASTOLIC BLOOD PRESSURE: 68 MMHG | BODY MASS INDEX: 38.04 KG/M2 | SYSTOLIC BLOOD PRESSURE: 132 MMHG

## 2019-09-13 DIAGNOSIS — N18.30 STAGE 3 CHRONIC KIDNEY DISEASE (HCC): Primary | ICD-10-CM

## 2019-09-13 DIAGNOSIS — R60.0 LOCALIZED EDEMA: ICD-10-CM

## 2019-09-13 DIAGNOSIS — I12.9 HYPERTENSIVE KIDNEY DISEASE WITH STAGE 3 CHRONIC KIDNEY DISEASE (HCC): ICD-10-CM

## 2019-09-13 DIAGNOSIS — N18.30 HYPERTENSIVE KIDNEY DISEASE WITH STAGE 3 CHRONIC KIDNEY DISEASE (HCC): ICD-10-CM

## 2019-09-13 PROCEDURE — 99214 OFFICE O/P EST MOD 30 MIN: CPT | Performed by: INTERNAL MEDICINE

## 2019-09-13 RX ORDER — ICOSAPENT ETHYL 1000 MG/1
1 CAPSULE ORAL 2 TIMES DAILY
COMMUNITY
Start: 2019-08-27 | End: 2020-04-27 | Stop reason: SDUPTHER

## 2019-09-13 RX ORDER — LOSARTAN POTASSIUM 100 MG/1
100 TABLET ORAL DAILY
COMMUNITY
Start: 2019-08-20

## 2019-09-13 NOTE — PROGRESS NOTES
[facility] [place], [place]    Name: Bryan Correa  YOB: 1950      Assessment/Plan:    Stage 3 chronic kidney disease [facility])  Renal function is stable, baseline Cr tends to be around 1 5 mg/dL  Type 2 diabetes mellitus with chronic kidney disease, with long-term current use of insulin ([facility])  Lab Results   Component Value Date    HGBA1C 7 8 05/09/2018       No results for input(s): POCGLU in the last 72 hours  Blood Sugar Average: Last 72 hrs:       Patient's last hemoglobin A1c was about 7 1%  Continue to focus on low carb diet, continue diabetic management according to PCP  Hypertensive kidney disease with stage 3 chronic kidney disease ([facility] )  Continue control blood pressure with angiotensin receptor blocker  Patient has been doing well here  Localized edema  Continue low-sodium diet, diuretics as indicated  Problem List Items Addressed This Visit        Genitourinary    Stage 3 chronic kidney disease ([facility] ) - Primary     Renal function is stable, baseline Cr tends to be around 1 5 mg/dL  Relevant Orders    Comprehensive metabolic panel    CBC    Urinalysis with microscopic    Microalbumin / creatinine urine ratio    Magnesium    Hypertensive kidney disease with stage 3 chronic kidney disease ([facility] [address] )     Continue control blood pressure with angiotensin receptor blocker  Patient has been doing well here  Other    Localized edema     Continue low-sodium diet, diuretics as indicated  Subjective:      Patient ID: Bryan Correa is a 78 y o  female  Patient is doing well at this time  She denies dysuria  She has been trying to abide by a low-carbohydrate low-sodium diet  Blood sugars in general been okay although on occasion she has elevated blood sugars and really has hypoglycemic events  Hypertension   This is a chronic problem  The current episode started more than 1 year ago   The problem is unchanged  The problem is controlled  Associated symptoms include peripheral edema  There are no associated agents to hypertension  Risk factors for coronary artery disease include diabetes mellitus, dyslipidemia, obesity and post-menopausal state  Past treatments include angiotensin blockers  There are no compliance problems  Hypertensive end-organ damage includes kidney disease  The following portions of the patient's history were reviewed and updated as appropriate: allergies, current medications, past family history, past medical history, past social history, past surgical history and problem list     Review of Systems   Cardiovascular: Positive for leg swelling  All other systems reviewed and are negative  Social History     Socioeconomic History    Marital status: /Civil Union     Spouse name: None    Number of children: None    Years of education: None    Highest education level: None   Occupational History    None   Social Needs    Financial resource strain: None    Food insecurity:     Worry: None     Inability: None    Transportation needs:     Medical: None     Non-medical: None   Tobacco Use    Smoking status: Never Smoker    Smokeless tobacco: Never Used   Substance and Sexual Activity    Alcohol use: No    Drug use: No    Sexual activity: None   Lifestyle    Physical activity:     Days per week: None     Minutes per session: None    Stress: None   Relationships    Social connections:     Talks on phone: None     Gets together: None     Attends Zoroastrianism service: None     Active member of club or organization: None     Attends meetings of clubs or organizations: None     Relationship status: None    Intimate partner violence:     Fear of current or ex partner: None     Emotionally abused: None     Physically abused: None     Forced sexual activity: None   Other Topics Concern    None   Social History Narrative    None     History reviewed   No pertinent past medical history  History reviewed  No pertinent surgical history      Current Outpatient Medications:     aspirin 81 MG tablet, Take by mouth, Disp: , Rfl:     B Complex-Folic Acid (SUPER B COMPLEX MAXI) TABS, Take by mouth, Disp: , Rfl:     Calcium-Vitamin D 500-125 MG-UNIT TABS, Take by mouth, Disp: , Rfl:     Cholecalciferol (VITAMIN D3) 5000 units CAPS, Take by mouth, Disp: , Rfl:     ezetimibe-simvastatin (VYTORIN) 10-80 MG per tablet, Take 1 tablet by mouth every evening, Disp: 90 tablet, Rfl: 2    furosemide (LASIX) 40 mg tablet, , Disp: , Rfl:     glucose blood (FREESTYLE LITE) test strip, 1 each by Other route 4 (four) times a day, Disp: 100 each, Rfl: 5    insulin lispro (HUMALOG) 100 units/mL injection, Inject under the skin, Disp: , Rfl:     Insulin Pen Needle (B-D ULTRAFINE III SHORT PEN) 31G X 8 MM MISC, by Does not apply route, Disp: , Rfl:     levothyroxine 100 mcg tablet, Take 1 tablet by mouth daily, Disp: , Rfl:     metoprolol tartrate (LOPRESSOR) 50 mg tablet, Take 0 5 tablets (25 mg total) by mouth 2 (two) times a day, Disp: 180 tablet, Rfl: 2    Soy Isoflavone 40 MG TABS, Take by mouth, Disp: , Rfl:     VICTOZA 18 MG/3ML SOPN, , Disp: , Rfl:     Choline Fenofibrate (FENOFIBRIC ACID) 135 MG CPDR, , Disp: , Rfl:     Choline Fenofibrate (FENOFIBRIC ACID) 135 MG CPDR, Take 1 capsule by mouth daily, Disp: , Rfl:     losartan (COZAAR) 100 MG tablet, Take 100 mg by mouth daily, Disp: , Rfl:     Omega-3 Fatty Acids (OMEGA-3 EPA FISH OIL) 1205 MG CAPS, Take by mouth, Disp: , Rfl:     valsartan (DIOVAN) 160 mg tablet, Take 1 tablet by mouth daily, Disp: , Rfl:     VASCEPA 1 g CAPS, Take 1 g by mouth 2 (two) times a day, Disp: , Rfl:     Lab Results   Component Value Date     06/16/2015    SODIUM 135 (L) 09/10/2019    K 3 7 09/10/2019     09/10/2019    CO2 28 09/10/2019    ANIONGAP 10 06/16/2015    AGAP 5 09/10/2019    BUN 30 (H) 09/10/2019    CREATININE 1 39 (H) 09/10/2019 GLUC 112 02/24/2017    GLUF 140 (H) 09/10/2019    CALCIUM 9 5 09/10/2019    AST 32 09/05/2018    ALT 45 09/05/2018    ALKPHOS 50 09/05/2018    PROT 7 3 05/07/2015    TP 7 3 09/05/2018    BILITOT 0 5 05/07/2015    TBILI 0 57 09/05/2018    EGFR 38 09/10/2019     Lab Results   Component Value Date    WBC 6 82 12/27/2018    HGB 12 1 12/27/2018    HCT 36 5 12/27/2018     (H) 12/27/2018     12/27/2018     Lab Results   Component Value Date    CHOLESTEROL 179 12/27/2018    CHOLESTEROL 170 09/05/2018    CHOLESTEROL 188 02/22/2018     Lab Results   Component Value Date    HDL 34 (L) 12/27/2018    HDL 34 (L) 09/05/2018    HDL 35 (L) 02/22/2018     Lab Results   Component Value Date    LDLCALC 99 12/27/2018    LDLCALC 90 09/05/2018    LDLCALC 97 02/22/2018     Lab Results   Component Value Date    TRIG 250 (H) 09/10/2019    TRIG 309 (H) 05/07/2019    TRIG 231 (H) 12/27/2018     No results found for: [place], [place]  Lab Results   Component Value Date    [id number] 0 786 12/27/2018     Lab Results   Component Value Date    PTH 26 2 09/10/2019    CALCIUM 9 5 09/10/2019     No results found for: SPEP, UPEP  No results found for: AVRIL [id number]        Objective:      /68 (BP Location: Left arm, Patient Position: Sitting, Cuff Size: Standard)   Ht 5' 4" (1 626 m)   Wt 101 kg (222 lb 12 8 oz)   BMI 38 24 kg/m²          Physical Exam   Constitutional: She is oriented to person, place, and time  She appears well-developed and well-nourished  No distress  HENT:   Head: Normocephalic and atraumatic  Eyes: Conjunctivae are normal    Neck: Neck supple  Cardiovascular: Normal rate and regular rhythm  Pulmonary/Chest: Effort normal and breath sounds normal    Abdominal: Soft  Musculoskeletal: She exhibits no edema  Neurological: She is alert and oriented to person, place, and time  No cranial nerve deficit  Skin: Skin is warm  No rash noted  Psychiatric: She has a normal mood and affect   Her behavior is normal

## 2019-09-13 NOTE — ASSESSMENT & PLAN NOTE
Lab Results   Component Value Date    HGBA1C 7 8 05/09/2018       No results for input(s): POCGLU in the last 72 hours  Blood Sugar Average: Last 72 hrs:       Patient's last hemoglobin A1c was about 7 1%  Continue to focus on low carb diet, continue diabetic management according to PCP

## 2019-09-13 NOTE — ASSESSMENT & PLAN NOTE
Continue control blood pressure with angiotensin receptor blocker  Patient has been doing well here

## 2020-04-20 ENCOUNTER — APPOINTMENT (OUTPATIENT)
Dept: LAB | Facility: MEDICAL CENTER | Age: 70
End: 2020-04-20
Payer: MEDICARE

## 2020-04-20 ENCOUNTER — TRANSCRIBE ORDERS (OUTPATIENT)
Dept: LAB | Facility: MEDICAL CENTER | Age: 70
End: 2020-04-20

## 2020-04-20 DIAGNOSIS — E03.9 MYXEDEMA HEART DISEASE: ICD-10-CM

## 2020-04-20 DIAGNOSIS — I51.9 MYXEDEMA HEART DISEASE: ICD-10-CM

## 2020-04-20 DIAGNOSIS — Z96.41 PRESENCE OF INSULIN PUMP: ICD-10-CM

## 2020-04-20 DIAGNOSIS — N18.30 STAGE 3 CHRONIC KIDNEY DISEASE (HCC): ICD-10-CM

## 2020-04-20 DIAGNOSIS — E78.5 HYPERLIPIDEMIA, UNSPECIFIED HYPERLIPIDEMIA TYPE: ICD-10-CM

## 2020-04-20 DIAGNOSIS — E11.65 UNCONTROLLED TYPE 2 DIABETES MELLITUS WITH HYPERGLYCEMIA (HCC): ICD-10-CM

## 2020-04-20 DIAGNOSIS — E11.65 UNCONTROLLED TYPE 2 DIABETES MELLITUS WITH HYPERGLYCEMIA (HCC): Primary | ICD-10-CM

## 2020-04-20 LAB
ALBUMIN SERPL BCP-MCNC: 3.7 G/DL (ref 3.5–5)
ALP SERPL-CCNC: 74 U/L (ref 46–116)
ALT SERPL W P-5'-P-CCNC: 68 U/L (ref 12–78)
ANION GAP SERPL CALCULATED.3IONS-SCNC: 8 MMOL/L (ref 4–13)
AST SERPL W P-5'-P-CCNC: 48 U/L (ref 5–45)
BACTERIA UR QL AUTO: ABNORMAL /HPF
BASOPHILS # BLD AUTO: 0.03 THOUSANDS/ΜL (ref 0–0.1)
BASOPHILS NFR BLD AUTO: 0 % (ref 0–1)
BILIRUB SERPL-MCNC: 0.68 MG/DL (ref 0.2–1)
BILIRUB UR QL STRIP: NEGATIVE
BUN SERPL-MCNC: 37 MG/DL (ref 5–25)
CALCIUM SERPL-MCNC: 9.8 MG/DL (ref 8.3–10.1)
CHLORIDE SERPL-SCNC: 105 MMOL/L (ref 100–108)
CHOLEST SERPL-MCNC: 199 MG/DL (ref 50–200)
CLARITY UR: ABNORMAL
CO2 SERPL-SCNC: 26 MMOL/L (ref 21–32)
COLOR UR: YELLOW
CREAT SERPL-MCNC: 1.42 MG/DL (ref 0.6–1.3)
CREAT UR-MCNC: 232 MG/DL
EOSINOPHIL # BLD AUTO: 0.17 THOUSAND/ΜL (ref 0–0.61)
EOSINOPHIL NFR BLD AUTO: 2 % (ref 0–6)
ERYTHROCYTE [DISTWIDTH] IN BLOOD BY AUTOMATED COUNT: 12.5 % (ref 11.6–15.1)
GFR SERPL CREATININE-BSD FRML MDRD: 37 ML/MIN/1.73SQ M
GLUCOSE P FAST SERPL-MCNC: 130 MG/DL (ref 65–99)
GLUCOSE UR STRIP-MCNC: NEGATIVE MG/DL
HCT VFR BLD AUTO: 42 % (ref 34.8–46.1)
HDLC SERPL-MCNC: 36 MG/DL
HGB BLD-MCNC: 14.4 G/DL (ref 11.5–15.4)
HGB UR QL STRIP.AUTO: NEGATIVE
HYALINE CASTS #/AREA URNS LPF: ABNORMAL /LPF
IMM GRANULOCYTES # BLD AUTO: 0.02 THOUSAND/UL (ref 0–0.2)
IMM GRANULOCYTES NFR BLD AUTO: 0 % (ref 0–2)
IRON SERPL-MCNC: 143 UG/DL (ref 50–170)
KETONES UR STRIP-MCNC: NEGATIVE MG/DL
LEUKOCYTE ESTERASE UR QL STRIP: ABNORMAL
LYMPHOCYTES # BLD AUTO: 3.23 THOUSANDS/ΜL (ref 0.6–4.47)
LYMPHOCYTES NFR BLD AUTO: 42 % (ref 14–44)
MAGNESIUM SERPL-MCNC: 1.8 MG/DL (ref 1.6–2.6)
MCH RBC QN AUTO: 33.3 PG (ref 26.8–34.3)
MCHC RBC AUTO-ENTMCNC: 34.3 G/DL (ref 31.4–37.4)
MCV RBC AUTO: 97 FL (ref 82–98)
MICROALBUMIN UR-MCNC: 95.1 MG/L (ref 0–20)
MICROALBUMIN/CREAT 24H UR: 41 MG/G CREATININE (ref 0–30)
MONOCYTES # BLD AUTO: 0.48 THOUSAND/ΜL (ref 0.17–1.22)
MONOCYTES NFR BLD AUTO: 6 % (ref 4–12)
NEUTROPHILS # BLD AUTO: 3.84 THOUSANDS/ΜL (ref 1.85–7.62)
NEUTS SEG NFR BLD AUTO: 50 % (ref 43–75)
NITRITE UR QL STRIP: NEGATIVE
NON-SQ EPI CELLS URNS QL MICRO: ABNORMAL /HPF
NONHDLC SERPL-MCNC: 163 MG/DL
NRBC BLD AUTO-RTO: 0 /100 WBCS
PH UR STRIP.AUTO: 5.5 [PH]
PLATELET # BLD AUTO: 155 THOUSANDS/UL (ref 149–390)
PMV BLD AUTO: 12.6 FL (ref 8.9–12.7)
POTASSIUM SERPL-SCNC: 3.7 MMOL/L (ref 3.5–5.3)
PROT SERPL-MCNC: 7.6 G/DL (ref 6.4–8.2)
PROT UR STRIP-MCNC: ABNORMAL MG/DL
RBC # BLD AUTO: 4.32 MILLION/UL (ref 3.81–5.12)
RBC #/AREA URNS AUTO: ABNORMAL /HPF
SODIUM SERPL-SCNC: 139 MMOL/L (ref 136–145)
SP GR UR STRIP.AUTO: 1.02 (ref 1–1.03)
TRIGL SERPL-MCNC: 408 MG/DL
TSH SERPL DL<=0.05 MIU/L-ACNC: 1.72 UIU/ML (ref 0.36–3.74)
UROBILINOGEN UR QL STRIP.AUTO: 0.2 E.U./DL
WBC # BLD AUTO: 7.77 THOUSAND/UL (ref 4.31–10.16)
WBC #/AREA URNS AUTO: ABNORMAL /HPF

## 2020-04-20 PROCEDURE — 82043 UR ALBUMIN QUANTITATIVE: CPT | Performed by: INTERNAL MEDICINE

## 2020-04-20 PROCEDURE — 82652 VIT D 1 25-DIHYDROXY: CPT

## 2020-04-20 PROCEDURE — 80053 COMPREHEN METABOLIC PANEL: CPT

## 2020-04-20 PROCEDURE — 84443 ASSAY THYROID STIM HORMONE: CPT

## 2020-04-20 PROCEDURE — 83540 ASSAY OF IRON: CPT

## 2020-04-20 PROCEDURE — 85025 COMPLETE CBC W/AUTO DIFF WBC: CPT

## 2020-04-20 PROCEDURE — 36415 COLL VENOUS BLD VENIPUNCTURE: CPT

## 2020-04-20 PROCEDURE — 80061 LIPID PANEL: CPT

## 2020-04-20 PROCEDURE — 83735 ASSAY OF MAGNESIUM: CPT

## 2020-04-20 PROCEDURE — 81001 URINALYSIS AUTO W/SCOPE: CPT | Performed by: INTERNAL MEDICINE

## 2020-04-20 PROCEDURE — 82570 ASSAY OF URINE CREATININE: CPT | Performed by: INTERNAL MEDICINE

## 2020-04-23 LAB — 1,25(OH)2D3 SERPL-MCNC: 39.7 PG/ML (ref 19.9–79.3)

## 2020-04-27 ENCOUNTER — TELEMEDICINE (OUTPATIENT)
Dept: NEPHROLOGY | Facility: CLINIC | Age: 70
End: 2020-04-27
Payer: MEDICARE

## 2020-04-27 VITALS
HEART RATE: 74 BPM | DIASTOLIC BLOOD PRESSURE: 59 MMHG | SYSTOLIC BLOOD PRESSURE: 141 MMHG | BODY MASS INDEX: 38.24 KG/M2 | HEIGHT: 64 IN

## 2020-04-27 DIAGNOSIS — N25.81 SECONDARY HYPERPARATHYROIDISM OF RENAL ORIGIN (HCC): ICD-10-CM

## 2020-04-27 DIAGNOSIS — N18.30 STAGE 3 CHRONIC KIDNEY DISEASE (HCC): ICD-10-CM

## 2020-04-27 DIAGNOSIS — I12.9 HYPERTENSIVE KIDNEY DISEASE WITH STAGE 3 CHRONIC KIDNEY DISEASE (HCC): ICD-10-CM

## 2020-04-27 DIAGNOSIS — E78.5 HYPERLIPIDEMIA, UNSPECIFIED HYPERLIPIDEMIA TYPE: ICD-10-CM

## 2020-04-27 DIAGNOSIS — R60.0 LOCALIZED EDEMA: ICD-10-CM

## 2020-04-27 DIAGNOSIS — N18.30 HYPERTENSIVE KIDNEY DISEASE WITH STAGE 3 CHRONIC KIDNEY DISEASE (HCC): ICD-10-CM

## 2020-04-27 DIAGNOSIS — E78.2 MIXED HYPERLIPIDEMIA: Primary | ICD-10-CM

## 2020-04-27 DIAGNOSIS — E55.9 VITAMIN D INSUFFICIENCY: ICD-10-CM

## 2020-04-27 DIAGNOSIS — E11.21 DIABETIC NEPHROPATHY ASSOCIATED WITH TYPE 2 DIABETES MELLITUS (HCC): ICD-10-CM

## 2020-04-27 PROCEDURE — 99214 OFFICE O/P EST MOD 30 MIN: CPT | Performed by: INTERNAL MEDICINE

## 2020-04-27 RX ORDER — ICOSAPENT ETHYL 1000 MG/1
2 CAPSULE ORAL 2 TIMES DAILY
Qty: 120 CAPSULE | Refills: 5 | Status: SHIPPED | OUTPATIENT
Start: 2020-04-27 | End: 2020-04-27 | Stop reason: SDUPTHER

## 2020-04-27 RX ORDER — ICOSAPENT ETHYL 1000 MG/1
2 CAPSULE ORAL 2 TIMES DAILY
Qty: 360 CAPSULE | Refills: 3 | Status: SHIPPED | OUTPATIENT
Start: 2020-04-27 | End: 2021-04-21 | Stop reason: SDUPTHER

## 2020-04-28 ENCOUNTER — APPOINTMENT (OUTPATIENT)
Dept: LAB | Facility: MEDICAL CENTER | Age: 70
End: 2020-04-28
Payer: MEDICARE

## 2020-04-28 ENCOUNTER — TRANSCRIBE ORDERS (OUTPATIENT)
Dept: ADMINISTRATIVE | Facility: HOSPITAL | Age: 70
End: 2020-04-28

## 2020-04-28 DIAGNOSIS — E11.22 TYPE 2 DIABETES MELLITUS WITH DIABETIC CHRONIC KIDNEY DISEASE, UNSPECIFIED CKD STAGE, UNSPECIFIED WHETHER LONG TERM INSULIN USE (HCC): ICD-10-CM

## 2020-04-28 DIAGNOSIS — E78.5 HYPERLIPIDEMIA, UNSPECIFIED HYPERLIPIDEMIA TYPE: ICD-10-CM

## 2020-04-28 DIAGNOSIS — E03.9 HYPOTHYROIDISM, UNSPECIFIED TYPE: ICD-10-CM

## 2020-04-28 DIAGNOSIS — E11.22 TYPE 2 DIABETES MELLITUS WITH DIABETIC CHRONIC KIDNEY DISEASE, UNSPECIFIED CKD STAGE, UNSPECIFIED WHETHER LONG TERM INSULIN USE (HCC): Primary | ICD-10-CM

## 2020-04-28 LAB
EST. AVERAGE GLUCOSE BLD GHB EST-MCNC: 160 MG/DL
HBA1C MFR BLD: 7.2 %

## 2020-04-28 PROCEDURE — 36415 COLL VENOUS BLD VENIPUNCTURE: CPT

## 2020-04-28 PROCEDURE — 83036 HEMOGLOBIN GLYCOSYLATED A1C: CPT

## 2020-10-20 ENCOUNTER — TRANSCRIBE ORDERS (OUTPATIENT)
Dept: LAB | Facility: MEDICAL CENTER | Age: 70
End: 2020-10-20

## 2020-10-20 ENCOUNTER — LAB (OUTPATIENT)
Dept: LAB | Facility: MEDICAL CENTER | Age: 70
End: 2020-10-20
Payer: MEDICARE

## 2020-10-20 DIAGNOSIS — E03.9 MYXEDEMA HEART DISEASE: ICD-10-CM

## 2020-10-20 DIAGNOSIS — E11.29 TYPE 2 DIABETES MELLITUS WITH OTHER DIABETIC KIDNEY COMPLICATION, WITH LONG-TERM CURRENT USE OF INSULIN (HCC): ICD-10-CM

## 2020-10-20 DIAGNOSIS — I51.9 MYXEDEMA HEART DISEASE: ICD-10-CM

## 2020-10-20 DIAGNOSIS — N25.81 SECONDARY HYPERPARATHYROIDISM OF RENAL ORIGIN (HCC): ICD-10-CM

## 2020-10-20 DIAGNOSIS — E78.5 HYPERLIPIDEMIA, UNSPECIFIED HYPERLIPIDEMIA TYPE: Primary | ICD-10-CM

## 2020-10-20 DIAGNOSIS — E78.5 HYPERLIPIDEMIA, UNSPECIFIED HYPERLIPIDEMIA TYPE: ICD-10-CM

## 2020-10-20 DIAGNOSIS — Z96.41 INSULIN PUMP STATUS: ICD-10-CM

## 2020-10-20 DIAGNOSIS — Z79.4 TYPE 2 DIABETES MELLITUS WITH OTHER DIABETIC KIDNEY COMPLICATION, WITH LONG-TERM CURRENT USE OF INSULIN (HCC): ICD-10-CM

## 2020-10-20 DIAGNOSIS — N18.30 STAGE 3 CHRONIC KIDNEY DISEASE (HCC): ICD-10-CM

## 2020-10-20 DIAGNOSIS — E78.2 MIXED HYPERLIPIDEMIA: ICD-10-CM

## 2020-10-20 LAB
25(OH)D3 SERPL-MCNC: 61.8 NG/ML (ref 30–100)
ALBUMIN SERPL BCP-MCNC: 3.5 G/DL (ref 3.5–5)
ALP SERPL-CCNC: 60 U/L (ref 46–116)
ALT SERPL W P-5'-P-CCNC: 45 U/L (ref 12–78)
ANION GAP SERPL CALCULATED.3IONS-SCNC: 5 MMOL/L (ref 4–13)
AST SERPL W P-5'-P-CCNC: 31 U/L (ref 5–45)
BILIRUB SERPL-MCNC: 0.6 MG/DL (ref 0.2–1)
BUN SERPL-MCNC: 26 MG/DL (ref 5–25)
CALCIUM SERPL-MCNC: 8.6 MG/DL (ref 8.3–10.1)
CHLORIDE SERPL-SCNC: 102 MMOL/L (ref 100–108)
CHOLEST SERPL-MCNC: 170 MG/DL (ref 50–200)
CO2 SERPL-SCNC: 33 MMOL/L (ref 21–32)
CREAT SERPL-MCNC: 1.42 MG/DL (ref 0.6–1.3)
ERYTHROCYTE [DISTWIDTH] IN BLOOD BY AUTOMATED COUNT: 12.5 % (ref 11.6–15.1)
GFR SERPL CREATININE-BSD FRML MDRD: 37 ML/MIN/1.73SQ M
GLUCOSE P FAST SERPL-MCNC: 103 MG/DL (ref 65–99)
HCT VFR BLD AUTO: 39 % (ref 34.8–46.1)
HDLC SERPL-MCNC: 43 MG/DL
HGB BLD-MCNC: 13.1 G/DL (ref 11.5–15.4)
LDLC SERPL CALC-MCNC: 88 MG/DL (ref 0–100)
MAGNESIUM SERPL-MCNC: 1.9 MG/DL (ref 1.6–2.6)
MCH RBC QN AUTO: 33.7 PG (ref 26.8–34.3)
MCHC RBC AUTO-ENTMCNC: 33.6 G/DL (ref 31.4–37.4)
MCV RBC AUTO: 100 FL (ref 82–98)
NONHDLC SERPL-MCNC: 127 MG/DL
PLATELET # BLD AUTO: 171 THOUSANDS/UL (ref 149–390)
PMV BLD AUTO: 12.8 FL (ref 8.9–12.7)
POTASSIUM SERPL-SCNC: 3.6 MMOL/L (ref 3.5–5.3)
PROT SERPL-MCNC: 7.4 G/DL (ref 6.4–8.2)
PTH-INTACT SERPL-MCNC: 45.7 PG/ML (ref 18.4–80.1)
RBC # BLD AUTO: 3.89 MILLION/UL (ref 3.81–5.12)
SODIUM SERPL-SCNC: 140 MMOL/L (ref 136–145)
TRIGL SERPL-MCNC: 196 MG/DL
TSH SERPL DL<=0.05 MIU/L-ACNC: 0.77 UIU/ML (ref 0.36–3.74)
WBC # BLD AUTO: 7.74 THOUSAND/UL (ref 4.31–10.16)

## 2020-10-20 PROCEDURE — 83735 ASSAY OF MAGNESIUM: CPT

## 2020-10-20 PROCEDURE — 36415 COLL VENOUS BLD VENIPUNCTURE: CPT

## 2020-10-20 PROCEDURE — 84443 ASSAY THYROID STIM HORMONE: CPT

## 2020-10-20 PROCEDURE — 83970 ASSAY OF PARATHORMONE: CPT

## 2020-10-20 PROCEDURE — 80053 COMPREHEN METABOLIC PANEL: CPT

## 2020-10-20 PROCEDURE — 85027 COMPLETE CBC AUTOMATED: CPT

## 2020-10-20 PROCEDURE — 80061 LIPID PANEL: CPT

## 2020-10-20 PROCEDURE — 82306 VITAMIN D 25 HYDROXY: CPT

## 2020-10-23 ENCOUNTER — OFFICE VISIT (OUTPATIENT)
Dept: NEPHROLOGY | Facility: CLINIC | Age: 70
End: 2020-10-23
Payer: MEDICARE

## 2020-10-23 VITALS
HEIGHT: 64 IN | OXYGEN SATURATION: 97 % | BODY MASS INDEX: 38.58 KG/M2 | WEIGHT: 226 LBS | SYSTOLIC BLOOD PRESSURE: 142 MMHG | TEMPERATURE: 97.1 F | HEART RATE: 63 BPM | DIASTOLIC BLOOD PRESSURE: 78 MMHG

## 2020-10-23 DIAGNOSIS — E11.21 DIABETIC NEPHROPATHY ASSOCIATED WITH TYPE 2 DIABETES MELLITUS (HCC): ICD-10-CM

## 2020-10-23 DIAGNOSIS — E78.2 MIXED HYPERLIPIDEMIA: ICD-10-CM

## 2020-10-23 DIAGNOSIS — N18.32 HYPERTENSIVE KIDNEY DISEASE WITH STAGE 3B CHRONIC KIDNEY DISEASE (HCC): ICD-10-CM

## 2020-10-23 DIAGNOSIS — I12.9 HYPERTENSIVE KIDNEY DISEASE WITH STAGE 3B CHRONIC KIDNEY DISEASE (HCC): ICD-10-CM

## 2020-10-23 DIAGNOSIS — R60.0 LOCALIZED EDEMA: ICD-10-CM

## 2020-10-23 DIAGNOSIS — N18.32 STAGE 3B CHRONIC KIDNEY DISEASE (HCC): Primary | ICD-10-CM

## 2020-10-23 DIAGNOSIS — L30.9 ECZEMA, UNSPECIFIED TYPE: ICD-10-CM

## 2020-10-23 PROCEDURE — 99214 OFFICE O/P EST MOD 30 MIN: CPT | Performed by: INTERNAL MEDICINE

## 2020-10-23 RX ORDER — CEPHALEXIN 500 MG/1
CAPSULE ORAL
COMMUNITY
Start: 2020-09-17 | End: 2020-10-23

## 2020-10-23 RX ORDER — PREDNISONE 10 MG/1
TABLET ORAL
COMMUNITY
Start: 2020-09-04 | End: 2021-10-25

## 2020-11-24 ENCOUNTER — TRANSCRIBE ORDERS (OUTPATIENT)
Dept: LAB | Facility: MEDICAL CENTER | Age: 70
End: 2020-11-24

## 2020-11-24 ENCOUNTER — APPOINTMENT (OUTPATIENT)
Dept: LAB | Facility: MEDICAL CENTER | Age: 70
End: 2020-11-24
Payer: MEDICARE

## 2020-11-24 DIAGNOSIS — Z96.41 PRESENCE OF INSULIN PUMP (EXTERNAL) (INTERNAL): ICD-10-CM

## 2020-11-24 DIAGNOSIS — Z79.4 LONG TERM CURRENT USE OF INSULIN (HCC): ICD-10-CM

## 2020-11-24 DIAGNOSIS — E11.29 TYPE 2 DIABETES MELLITUS WITH OTHER KIDNEY COMPLICATION, UNSPECIFIED WHETHER LONG TERM INSULIN USE (HCC): Primary | ICD-10-CM

## 2020-11-24 DIAGNOSIS — E11.29 TYPE 2 DIABETES MELLITUS WITH OTHER KIDNEY COMPLICATION, UNSPECIFIED WHETHER LONG TERM INSULIN USE (HCC): ICD-10-CM

## 2020-11-24 LAB
EST. AVERAGE GLUCOSE BLD GHB EST-MCNC: 154 MG/DL
HBA1C MFR BLD: 7 %

## 2020-11-24 PROCEDURE — 36415 COLL VENOUS BLD VENIPUNCTURE: CPT

## 2020-11-24 PROCEDURE — 83036 HEMOGLOBIN GLYCOSYLATED A1C: CPT

## 2021-03-09 DIAGNOSIS — Z23 ENCOUNTER FOR IMMUNIZATION: ICD-10-CM

## 2021-04-16 ENCOUNTER — TRANSCRIBE ORDERS (OUTPATIENT)
Dept: LAB | Facility: MEDICAL CENTER | Age: 71
End: 2021-04-16

## 2021-04-16 ENCOUNTER — APPOINTMENT (OUTPATIENT)
Dept: LAB | Facility: MEDICAL CENTER | Age: 71
End: 2021-04-16
Payer: MEDICARE

## 2021-04-16 DIAGNOSIS — E03.9 HYPOTHYROIDISM, UNSPECIFIED TYPE: ICD-10-CM

## 2021-04-16 DIAGNOSIS — E78.5 HYPERLIPIDEMIA, UNSPECIFIED HYPERLIPIDEMIA TYPE: ICD-10-CM

## 2021-04-16 DIAGNOSIS — N18.32 STAGE 3B CHRONIC KIDNEY DISEASE (HCC): ICD-10-CM

## 2021-04-16 DIAGNOSIS — E11.65 TYPE 2 DIABETES MELLITUS WITH HYPERGLYCEMIA, UNSPECIFIED WHETHER LONG TERM INSULIN USE (HCC): Primary | ICD-10-CM

## 2021-04-16 LAB
25(OH)D3 SERPL-MCNC: 57 NG/ML (ref 30–100)
ALBUMIN SERPL BCP-MCNC: 3.7 G/DL (ref 3.5–5)
ALP SERPL-CCNC: 85 U/L (ref 46–116)
ALT SERPL W P-5'-P-CCNC: 44 U/L (ref 12–78)
ANION GAP SERPL CALCULATED.3IONS-SCNC: 4 MMOL/L (ref 4–13)
AST SERPL W P-5'-P-CCNC: 27 U/L (ref 5–45)
BACTERIA UR QL AUTO: ABNORMAL /HPF
BASOPHILS # BLD AUTO: 0.04 THOUSANDS/ΜL (ref 0–0.1)
BASOPHILS NFR BLD AUTO: 1 % (ref 0–1)
BILIRUB SERPL-MCNC: 0.65 MG/DL (ref 0.2–1)
BILIRUB UR QL STRIP: NEGATIVE
BUN SERPL-MCNC: 22 MG/DL (ref 5–25)
CALCIUM SERPL-MCNC: 10.7 MG/DL (ref 8.3–10.1)
CAOX CRY URNS QL MICRO: ABNORMAL /HPF
CHLORIDE SERPL-SCNC: 104 MMOL/L (ref 100–108)
CHOLEST SERPL-MCNC: 181 MG/DL (ref 50–200)
CLARITY UR: ABNORMAL
CO2 SERPL-SCNC: 30 MMOL/L (ref 21–32)
COLOR UR: ABNORMAL
CREAT SERPL-MCNC: 1.39 MG/DL (ref 0.6–1.3)
CREAT UR-MCNC: 152 MG/DL
EOSINOPHIL # BLD AUTO: 0.14 THOUSAND/ΜL (ref 0–0.61)
EOSINOPHIL NFR BLD AUTO: 2 % (ref 0–6)
ERYTHROCYTE [DISTWIDTH] IN BLOOD BY AUTOMATED COUNT: 13.3 % (ref 11.6–15.1)
GFR SERPL CREATININE-BSD FRML MDRD: 38 ML/MIN/1.73SQ M
GLUCOSE P FAST SERPL-MCNC: 103 MG/DL (ref 65–99)
GLUCOSE UR STRIP-MCNC: NEGATIVE MG/DL
HCT VFR BLD AUTO: 40.7 % (ref 34.8–46.1)
HDLC SERPL-MCNC: 41 MG/DL
HGB BLD-MCNC: 13.9 G/DL (ref 11.5–15.4)
HGB UR QL STRIP.AUTO: NEGATIVE
IMM GRANULOCYTES # BLD AUTO: 0.02 THOUSAND/UL (ref 0–0.2)
IMM GRANULOCYTES NFR BLD AUTO: 0 % (ref 0–2)
KETONES UR STRIP-MCNC: NEGATIVE MG/DL
LDLC SERPL CALC-MCNC: 87 MG/DL (ref 0–100)
LEUKOCYTE ESTERASE UR QL STRIP: NEGATIVE
LYMPHOCYTES # BLD AUTO: 3.05 THOUSANDS/ΜL (ref 0.6–4.47)
LYMPHOCYTES NFR BLD AUTO: 40 % (ref 14–44)
MAGNESIUM SERPL-MCNC: 1.9 MG/DL (ref 1.6–2.6)
MCH RBC QN AUTO: 34.2 PG (ref 26.8–34.3)
MCHC RBC AUTO-ENTMCNC: 34.2 G/DL (ref 31.4–37.4)
MCV RBC AUTO: 100 FL (ref 82–98)
MICROALBUMIN UR-MCNC: 172 MG/L (ref 0–20)
MICROALBUMIN/CREAT 24H UR: 113 MG/G CREATININE (ref 0–30)
MONOCYTES # BLD AUTO: 0.46 THOUSAND/ΜL (ref 0.17–1.22)
MONOCYTES NFR BLD AUTO: 6 % (ref 4–12)
NEUTROPHILS # BLD AUTO: 3.87 THOUSANDS/ΜL (ref 1.85–7.62)
NEUTS SEG NFR BLD AUTO: 51 % (ref 43–75)
NITRITE UR QL STRIP: NEGATIVE
NON-SQ EPI CELLS URNS QL MICRO: ABNORMAL /HPF
NONHDLC SERPL-MCNC: 140 MG/DL
NRBC BLD AUTO-RTO: 0 /100 WBCS
PH UR STRIP.AUTO: 6.5 [PH]
PLATELET # BLD AUTO: 173 THOUSANDS/UL (ref 149–390)
PMV BLD AUTO: 12.6 FL (ref 8.9–12.7)
POTASSIUM SERPL-SCNC: 3.8 MMOL/L (ref 3.5–5.3)
PROT SERPL-MCNC: 7.9 G/DL (ref 6.4–8.2)
PROT UR STRIP-MCNC: ABNORMAL MG/DL
RBC # BLD AUTO: 4.07 MILLION/UL (ref 3.81–5.12)
RBC #/AREA URNS AUTO: ABNORMAL /HPF
SODIUM SERPL-SCNC: 138 MMOL/L (ref 136–145)
SP GR UR STRIP.AUTO: 1.02 (ref 1–1.03)
T3FREE SERPL-MCNC: 2.18 PG/ML (ref 2.3–4.2)
T4 FREE SERPL-MCNC: 1.1 NG/DL (ref 0.76–1.46)
TRIGL SERPL-MCNC: 264 MG/DL
TSH SERPL DL<=0.05 MIU/L-ACNC: 2.38 UIU/ML (ref 0.36–3.74)
UROBILINOGEN UR QL STRIP.AUTO: 0.2 E.U./DL
WBC # BLD AUTO: 7.58 THOUSAND/UL (ref 4.31–10.16)
WBC #/AREA URNS AUTO: ABNORMAL /HPF

## 2021-04-16 PROCEDURE — 36415 COLL VENOUS BLD VENIPUNCTURE: CPT

## 2021-04-16 PROCEDURE — 82570 ASSAY OF URINE CREATININE: CPT | Performed by: INTERNAL MEDICINE

## 2021-04-16 PROCEDURE — 81001 URINALYSIS AUTO W/SCOPE: CPT | Performed by: INTERNAL MEDICINE

## 2021-04-16 PROCEDURE — 80061 LIPID PANEL: CPT

## 2021-04-16 PROCEDURE — 80053 COMPREHEN METABOLIC PANEL: CPT

## 2021-04-16 PROCEDURE — 83735 ASSAY OF MAGNESIUM: CPT

## 2021-04-16 PROCEDURE — 82306 VITAMIN D 25 HYDROXY: CPT

## 2021-04-16 PROCEDURE — 82043 UR ALBUMIN QUANTITATIVE: CPT | Performed by: INTERNAL MEDICINE

## 2021-04-16 PROCEDURE — 84481 FREE ASSAY (FT-3): CPT

## 2021-04-16 PROCEDURE — 85025 COMPLETE CBC W/AUTO DIFF WBC: CPT

## 2021-04-16 PROCEDURE — 84439 ASSAY OF FREE THYROXINE: CPT

## 2021-04-16 PROCEDURE — 84443 ASSAY THYROID STIM HORMONE: CPT

## 2021-04-21 ENCOUNTER — OFFICE VISIT (OUTPATIENT)
Dept: NEPHROLOGY | Facility: CLINIC | Age: 71
End: 2021-04-21
Payer: MEDICARE

## 2021-04-21 VITALS
OXYGEN SATURATION: 98 % | HEART RATE: 60 BPM | BODY MASS INDEX: 38.93 KG/M2 | WEIGHT: 228 LBS | DIASTOLIC BLOOD PRESSURE: 84 MMHG | SYSTOLIC BLOOD PRESSURE: 150 MMHG | HEIGHT: 64 IN

## 2021-04-21 DIAGNOSIS — R60.0 LOCALIZED EDEMA: ICD-10-CM

## 2021-04-21 DIAGNOSIS — N18.32 STAGE 3B CHRONIC KIDNEY DISEASE (HCC): Primary | ICD-10-CM

## 2021-04-21 DIAGNOSIS — E55.9 VITAMIN D INSUFFICIENCY: ICD-10-CM

## 2021-04-21 DIAGNOSIS — E11.21 DIABETIC NEPHROPATHY ASSOCIATED WITH TYPE 2 DIABETES MELLITUS (HCC): ICD-10-CM

## 2021-04-21 DIAGNOSIS — I12.9 HYPERTENSIVE KIDNEY DISEASE WITH STAGE 3B CHRONIC KIDNEY DISEASE (HCC): ICD-10-CM

## 2021-04-21 DIAGNOSIS — N18.32 HYPERTENSIVE KIDNEY DISEASE WITH STAGE 3B CHRONIC KIDNEY DISEASE (HCC): ICD-10-CM

## 2021-04-21 DIAGNOSIS — E78.2 MIXED HYPERLIPIDEMIA: ICD-10-CM

## 2021-04-21 PROCEDURE — 99214 OFFICE O/P EST MOD 30 MIN: CPT | Performed by: INTERNAL MEDICINE

## 2021-04-21 RX ORDER — ICOSAPENT ETHYL 1000 MG/1
2 CAPSULE ORAL 2 TIMES DAILY
Qty: 360 CAPSULE | Refills: 3 | Status: SHIPPED | OUTPATIENT
Start: 2021-04-21 | End: 2021-04-29

## 2021-04-21 RX ORDER — BRIMONIDINE TARTRATE 0.1 %
DROPS OPHTHALMIC (EYE)
COMMUNITY
Start: 2021-01-21

## 2021-04-21 RX ORDER — ZINC GLUCONATE 50 MG
TABLET ORAL
COMMUNITY
End: 2021-10-25

## 2021-04-21 NOTE — PROGRESS NOTES
[facility] [place], [place]    Name: Mir Rodgers  YOB: 1950      Assessment/Plan:    Stage 3 chronic kidney disease [facility])  Lab Results   Component Value Date    EGFR 38 04/16/2021    EGFR 37 10/20/2020    EGFR 37 04/20/2020    CREATININE 1 39 (H) 04/16/2021    CREATININE 1 42 (H) 10/20/2020    CREATININE 1 42 (H) 04/20/2020     Kidney function stable at this time, estimated GFR is just under 40 mL/minute  Continue to focus on appropriate blood sugar and blood pressure control  Diabetic nephropathy associated with type 2 diabetes mellitus ([facility] )  Blood sugars are little elevated compared to last check  We discussed the potential for initiation of an SGLT -2 inhibitor due to potential progression of underlying diabetic nephropathy  We will hold off for now, however at her next appointment if her proteinuria continues to persist or is higher, will proceed with starting her on Jardiance 10 mg once daily  Lab Results   Component Value Date    HGBA1C 7 0 (H) 11/24/2020       Hypertensive kidney disease with stage 3 chronic kidney disease ([facility])  Lab Results   Component Value Date    EGFR 38 04/16/2021    EGFR 37 10/20/2020    EGFR 37 04/20/2020    CREATININE 1 39 (H) 04/16/2021    CREATININE 1 42 (H) 10/20/2020    CREATININE 1 42 (H) 04/20/2020     Continue to adjust blood pressures best as possible  Blood pressure today slightly elevated, tend to be better controlled  Continue monitor at home, if indicated will add a low-dose of hydrochlorothiazide  Localized edema  Continue to focus on low-sodium diet, may take furosemide as needed  As mentioned above, if blood pressure continues to remain elevated we may initiate a daily diuretic such as hydrochlorothiazide 12 5 mg for blood pressure and improvement in edema           Problem List Items Addressed This Visit        Endocrine    Diabetic nephropathy associated with type 2 diabetes mellitus ([facility] )     Blood sugars are little elevated compared to last check  We discussed the potential for initiation of an SGLT -2 inhibitor due to potential progression of underlying diabetic nephropathy  We will hold off for now, however at her next appointment if her proteinuria continues to persist or is higher, will proceed with starting her on Jardiance 10 mg once daily  Lab Results   Component Value Date    HGBA1C 7 0 (H) 11/24/2020               Genitourinary    Stage 3 chronic kidney disease - Primary     Lab Results   Component Value Date    EGFR 38 04/16/2021    EGFR 37 10/20/2020    EGFR 37 04/20/2020    CREATININE 1 39 (H) 04/16/2021    CREATININE 1 42 (H) 10/20/2020    CREATININE 1 42 (H) 04/20/2020     Kidney function stable at this time, estimated GFR is just under 40 mL/minute  Continue to focus on appropriate blood sugar and blood pressure control  Relevant Orders    Microalbumin / creatinine urine ratio    Urinalysis with microscopic    Comprehensive metabolic panel    PTH, intact    Hypertensive kidney disease with stage 3 chronic kidney disease     Lab Results   Component Value Date    EGFR 38 04/16/2021    EGFR 37 10/20/2020    EGFR 37 04/20/2020    CREATININE 1 39 (H) 04/16/2021    CREATININE 1 42 (H) 10/20/2020    CREATININE 1 42 (H) 04/20/2020     Continue to adjust blood pressures best as possible  Blood pressure today slightly elevated, tend to be better controlled  Continue monitor at home, if indicated will add a low-dose of hydrochlorothiazide  Other    Hyperlipidemia    Relevant Medications    Vascepa 1 g CAPS    Vitamin D insufficiency    Localized edema     Continue to focus on low-sodium diet, may take furosemide as needed  As mentioned above, if blood pressure continues to remain elevated we may initiate a daily diuretic such as hydrochlorothiazide 12 5 mg for blood pressure and improvement in edema                 Patient is stable at this time, continue monitor blood pressures at home  No medication changes were made during today's appointment  Will consider starting Jardiance 10 mg once daily if proteinuria persists or worsens with next set of labs  We will see the patient back for regular appointment in 6 months  Subjective:      Patient ID: Hakeem Giron is a 66 y o  female  Patient presents for follow-up regarding chronic kidney disease  Patient's blood sugars have been improved overall, she is taking all medications as prescribed at this time  Labs reviewed with the patient, triglycerides are slightly elevated compared to last check but she is taking her Vascepa as prescribed, 2 tablets twice a day  Hypertension  This is a chronic problem  The current episode started more than 1 year ago  The problem is unchanged  The problem is controlled  Associated symptoms include peripheral edema  Pertinent negatives include no chest pain, orthopnea or shortness of breath  There are no associated agents to hypertension  Risk factors for coronary artery disease include diabetes mellitus, obesity and post-menopausal state  Past treatments include angiotensin blockers and lifestyle changes  There are no compliance problems  Hypertensive end-organ damage includes kidney disease  Identifiable causes of hypertension include chronic renal disease  The following portions of the patient's history were reviewed and updated as appropriate: allergies, current medications, past family history, past medical history, past social history, past surgical history and problem list     Review of Systems   Respiratory: Negative for shortness of breath  Cardiovascular: Negative for chest pain and orthopnea  All other systems reviewed and are negative          Social History     Socioeconomic History    Marital status: /Civil Union     Spouse name: None    Number of children: None    Years of education: None    Highest education level: None   Occupational History    Occupation: Retired -     Social Needs    Financial resource strain: None    Food insecurity     Worry: None     Inability: None    Transportation needs     Medical: None     Non-medical: None   Tobacco Use    Smoking status: Never Smoker    Smokeless tobacco: Never Used   Substance and Sexual Activity    Alcohol use: No    Drug use: No    Sexual activity: None   Lifestyle    Physical activity     Days per week: None     Minutes per session: None    Stress: None   Relationships    Social connections     Talks on phone: None     Gets together: None     Attends Scientologist service: None     Active member of club or organization: None     Attends meetings of clubs or organizations: None     Relationship status: None    Intimate partner violence     Fear of current or ex partner: None     Emotionally abused: None     Physically abused: None     Forced sexual activity: None   Other Topics Concern    None   Social History Narrative    Consumes on average 1 regular cup of coffee per day    Consumes on average 8 oz of soda per day - diet      Past Medical History:   Diagnosis Date    Benign essential hypertension     Cataracts, bilateral     s/p bilateratl extraction and lens implant     Chronic kidney disease, stage 3     Edema     Essential hypertension     Herpes zoster     Left upper chest    Hyperlipidemia     Hypothyroidism     Myocardial infarction ([facility] )     Proteinuria     Renal failure syndrome     Type 2 diabetes mellitus ([facility] )     Type II diabetes mellitus, uncontrolled [facility]     Vitamin D deficiency      Past Surgical History:   Procedure Laterality Date    BREAST SURGERY  1996    Lumpectomy - Benign     CATARACT EXTRACTION, BILATERAL  12/2015    HYSTERECTOMY      Total     JOINT REPLACEMENT Left 2012    Knee     JOINT REPLACEMENT Right 01/19/2017    TKR       Current Outpatient Medications:     Alphagan P 0 1 %, , Disp: , Rfl:     aspirin 81 MG tablet, Take by mouth, Disp: , Rfl:     B Complex-Folic Acid (SUPER B COMPLEX MAXI) TABS, Take by mouth, Disp: , Rfl:     Calcium-Vitamin D 500-125 MG-UNIT TABS, Take by mouth, Disp: , Rfl:     Cholecalciferol (VITAMIN D3) 5000 units CAPS, Take by mouth, Disp: , Rfl:     ezetimibe-simvastatin (VYTORIN) 10-80 MG per tablet, Take 1 tablet by mouth every evening, Disp: 90 tablet, Rfl: 2    furosemide (LASIX) 40 mg tablet, , Disp: , Rfl:     glucose blood (FREESTYLE LITE) test strip, 1 each by Other route 4 (four) times a day, Disp: 100 each, Rfl: 5    insulin lispro (HUMALOG) 100 units/mL injection, Inject under the skin, Disp: , Rfl:     Insulin Pen Needle (B-D ULTRAFINE III SHORT PEN) 31G X 8 MM MISC, by Does not apply route, Disp: , Rfl:     levothyroxine 100 mcg tablet, Take 1 tablet by mouth daily, Disp: , Rfl:     losartan (COZAAR) 100 MG tablet, Take 100 mg by mouth daily, Disp: , Rfl:     metoprolol tartrate (LOPRESSOR) 50 mg tablet, Take 0 5 tablets (25 mg total) by mouth 2 (two) times a day, Disp: 180 tablet, Rfl: 2    predniSONE 10 mg tablet, , Disp: , Rfl:     Vascepa 1 g CAPS, Take 2 capsules (2 g total) by mouth 2 (two) times a day, Disp: 360 capsule, Rfl: 3    VICTOZA 18 MG/3ML SOPN, , Disp: , Rfl:     Zinc 50 MG TABS, Take by mouth One once a day, Disp: , Rfl:     Soy Isoflavone 40 MG TABS, Take by mouth, Disp: , Rfl:     Lab Results   Component Value Date     06/16/2015    SODIUM 138 04/16/2021    K 3 8 04/16/2021     04/16/2021    CO2 30 04/16/2021    ANIONGAP 10 06/16/2015    AGAP 4 04/16/2021    BUN 22 04/16/2021    CREATININE 1 39 (H) 04/16/2021    GLUC 112 02/24/2017    GLUF 103 (H) 04/16/2021    CALCIUM 10 7 (H) 04/16/2021    AST 27 04/16/2021    ALT 44 04/16/2021    ALKPHOS 85 04/16/2021    PROT 7 3 05/07/2015    TP 7 9 04/16/2021    BILITOT 0 5 05/07/2015    TBILI 0 65 04/16/2021    EGFR 38 04/16/2021     Lab Results   Component Value Date    WBC 7 58 04/16/2021 HGB 13 9 04/16/2021    HCT 40 7 04/16/2021     (H) 04/16/2021     04/16/2021     Lab Results   Component Value Date    CHOLESTEROL 181 04/16/2021    CHOLESTEROL 170 10/20/2020    CHOLESTEROL 199 04/20/2020     Lab Results   Component Value Date    HDL 41 04/16/2021    HDL 43 10/20/2020    HDL 36 (L) 04/20/2020     Lab Results   Component Value Date    LDLCALC 87 04/16/2021    [address] 88 10/20/2020    [address]  04/20/2020      Comment:      Calculated LDL invalid, triglycerides >400 mg/dl  This screening LDL is a calculated result  It does not have the accuracy of the Direct Measured LDL in the monitoring of patients with hyperlipidemia and/or statin therapy  Direct Measure LDL (GZM291) must be ordered separately in these patients  Lab Results   Component Value Date    TRIG 264 (H) 04/16/2021    TRIG 196 (H) 10/20/2020    TRIG 408 (H) 04/20/2020     No results found for: [place], [place]  Lab Results   Component Value Date    [id number] 2 380 04/16/2021     Lab Results   Component Value Date    PTH 45 7 10/20/2020    CALCIUM 10 7 (H) 04/16/2021     No results found for: SPEP, UPEP  No results found for: KATHLEEN GARCIAS        Objective:      /84 (BP Location: Left arm, Patient Position: Sitting, Cuff Size: Large)   Pulse 60   Ht 5' 4" (1 626 m)   Wt 103 kg (228 lb)   SpO2 98%   BMI 39 14 kg/m²          Physical Exam  Vitals signs reviewed  Constitutional:       General: She is not in acute distress  Appearance: She is well-developed  HENT:      Head: Normocephalic and atraumatic  Eyes:      Conjunctiva/sclera: Conjunctivae normal    Neck:      Musculoskeletal: Neck supple  Cardiovascular:      Rate and Rhythm: Normal rate and regular rhythm  Pulmonary:      Effort: Pulmonary effort is normal       Breath sounds: Normal breath sounds  Abdominal:      Palpations: Abdomen is soft  Musculoskeletal:      Right lower leg: Edema present  Left lower leg: Edema present  Skin:     General: Skin is warm  Findings: No rash  Neurological:      Mental Status: She is alert and oriented to person, place, and time  Cranial Nerves: No cranial nerve deficit     Psychiatric:         Behavior: Behavior normal

## 2021-04-21 NOTE — ASSESSMENT & PLAN NOTE
Blood sugars are little elevated compared to last check  We discussed the potential for initiation of an SGLT -2 inhibitor due to potential progression of underlying diabetic nephropathy  We will hold off for now, however at her next appointment if her proteinuria continues to persist or is higher, will proceed with starting her on Jardiance 10 mg once daily      Lab Results   Component Value Date    HGBA1C 7 0 (H) 11/24/2020

## 2021-04-21 NOTE — ASSESSMENT & PLAN NOTE
Lab Results   Component Value Date    EGFR 38 04/16/2021    EGFR 37 10/20/2020    EGFR 37 04/20/2020    CREATININE 1 39 (H) 04/16/2021    CREATININE 1 42 (H) 10/20/2020    CREATININE 1 42 (H) 04/20/2020     Kidney function stable at this time, estimated GFR is just under 40 mL/minute  Continue to focus on appropriate blood sugar and blood pressure control

## 2021-04-21 NOTE — ASSESSMENT & PLAN NOTE
Lab Results   Component Value Date    EGFR 38 04/16/2021    EGFR 37 10/20/2020    EGFR 37 04/20/2020    CREATININE 1 39 (H) 04/16/2021    CREATININE 1 42 (H) 10/20/2020    CREATININE 1 42 (H) 04/20/2020     Continue to adjust blood pressures best as possible  Blood pressure today slightly elevated, tend to be better controlled  Continue monitor at home, if indicated will add a low-dose of hydrochlorothiazide

## 2021-04-21 NOTE — ASSESSMENT & PLAN NOTE
Continue to focus on low-sodium diet, may take furosemide as needed  As mentioned above, if blood pressure continues to remain elevated we may initiate a daily diuretic such as hydrochlorothiazide 12 5 mg for blood pressure and improvement in edema

## 2021-04-23 ENCOUNTER — TELEPHONE (OUTPATIENT)
Dept: NEPHROLOGY | Facility: CLINIC | Age: 71
End: 2021-04-23

## 2021-04-23 NOTE — TELEPHONE ENCOUNTER
[facility]  Phone number- [phone]  Reference number- [id number]    They will let us know within 72 hours of the outcome

## 2021-04-28 DIAGNOSIS — E78.2 MIXED HYPERLIPIDEMIA: ICD-10-CM

## 2021-04-29 RX ORDER — ICOSAPENT ETHYL 1000 MG/1
CAPSULE ORAL
Qty: 120 CAPSULE | Refills: 5 | Status: SHIPPED | OUTPATIENT
Start: 2021-04-29 | End: 2021-12-21

## 2021-10-19 ENCOUNTER — APPOINTMENT (OUTPATIENT)
Dept: LAB | Facility: MEDICAL CENTER | Age: 71
End: 2021-10-19
Payer: MEDICARE

## 2021-10-19 DIAGNOSIS — N18.32 STAGE 3B CHRONIC KIDNEY DISEASE (HCC): ICD-10-CM

## 2021-10-19 LAB
ALBUMIN SERPL BCP-MCNC: 3.4 G/DL (ref 3.5–5)
ALP SERPL-CCNC: 71 U/L (ref 46–116)
ALT SERPL W P-5'-P-CCNC: 38 U/L (ref 12–78)
ANION GAP SERPL CALCULATED.3IONS-SCNC: 3 MMOL/L (ref 4–13)
AST SERPL W P-5'-P-CCNC: 30 U/L (ref 5–45)
BACTERIA UR QL AUTO: ABNORMAL /HPF
BILIRUB SERPL-MCNC: 0.53 MG/DL (ref 0.2–1)
BILIRUB UR QL STRIP: NEGATIVE
BUN SERPL-MCNC: 26 MG/DL (ref 5–25)
CALCIUM ALBUM COR SERPL-MCNC: 10.8 MG/DL (ref 8.3–10.1)
CALCIUM SERPL-MCNC: 10.3 MG/DL (ref 8.3–10.1)
CAOX CRY URNS QL MICRO: ABNORMAL /HPF
CHLORIDE SERPL-SCNC: 109 MMOL/L (ref 100–108)
CLARITY UR: CLEAR
CO2 SERPL-SCNC: 28 MMOL/L (ref 21–32)
COLOR UR: YELLOW
CREAT SERPL-MCNC: 1.44 MG/DL (ref 0.6–1.3)
CREAT UR-MCNC: 147 MG/DL
GFR SERPL CREATININE-BSD FRML MDRD: 36 ML/MIN/1.73SQ M
GLUCOSE P FAST SERPL-MCNC: 112 MG/DL (ref 65–99)
GLUCOSE UR STRIP-MCNC: NEGATIVE MG/DL
HGB UR QL STRIP.AUTO: NEGATIVE
KETONES UR STRIP-MCNC: NEGATIVE MG/DL
LEUKOCYTE ESTERASE UR QL STRIP: NEGATIVE
MICROALBUMIN UR-MCNC: 82.6 MG/L (ref 0–20)
MICROALBUMIN/CREAT 24H UR: 56 MG/G CREATININE (ref 0–30)
MUCOUS THREADS UR QL AUTO: ABNORMAL
NITRITE UR QL STRIP: NEGATIVE
NON-SQ EPI CELLS URNS QL MICRO: ABNORMAL /HPF
OTHER STN SPEC: ABNORMAL
PH UR STRIP.AUTO: 6 [PH]
POTASSIUM SERPL-SCNC: 4.4 MMOL/L (ref 3.5–5.3)
PROT SERPL-MCNC: 7.4 G/DL (ref 6.4–8.2)
PROT UR STRIP-MCNC: NEGATIVE MG/DL
PTH-INTACT SERPL-MCNC: 12 PG/ML (ref 18.4–80.1)
RBC #/AREA URNS AUTO: ABNORMAL /HPF
SODIUM SERPL-SCNC: 140 MMOL/L (ref 136–145)
SP GR UR STRIP.AUTO: 1.02 (ref 1–1.03)
UROBILINOGEN UR QL STRIP.AUTO: 0.2 E.U./DL
WBC #/AREA URNS AUTO: ABNORMAL /HPF

## 2021-10-19 PROCEDURE — 83970 ASSAY OF PARATHORMONE: CPT

## 2021-10-19 PROCEDURE — 81001 URINALYSIS AUTO W/SCOPE: CPT | Performed by: INTERNAL MEDICINE

## 2021-10-19 PROCEDURE — 82043 UR ALBUMIN QUANTITATIVE: CPT | Performed by: INTERNAL MEDICINE

## 2021-10-19 PROCEDURE — 80053 COMPREHEN METABOLIC PANEL: CPT

## 2021-10-19 PROCEDURE — 82570 ASSAY OF URINE CREATININE: CPT | Performed by: INTERNAL MEDICINE

## 2021-10-19 PROCEDURE — 36415 COLL VENOUS BLD VENIPUNCTURE: CPT

## 2021-10-25 ENCOUNTER — OFFICE VISIT (OUTPATIENT)
Dept: NEPHROLOGY | Facility: CLINIC | Age: 71
End: 2021-10-25
Payer: MEDICARE

## 2021-10-25 VITALS
WEIGHT: 221 LBS | DIASTOLIC BLOOD PRESSURE: 82 MMHG | HEART RATE: 62 BPM | BODY MASS INDEX: 37.73 KG/M2 | OXYGEN SATURATION: 98 % | SYSTOLIC BLOOD PRESSURE: 148 MMHG | HEIGHT: 64 IN

## 2021-10-25 DIAGNOSIS — U07.1 COVID: ICD-10-CM

## 2021-10-25 DIAGNOSIS — N18.32 STAGE 3B CHRONIC KIDNEY DISEASE (HCC): Primary | ICD-10-CM

## 2021-10-25 DIAGNOSIS — I10 HYPERTENSION, UNSPECIFIED TYPE: ICD-10-CM

## 2021-10-25 DIAGNOSIS — E11.21 DIABETIC NEPHROPATHY ASSOCIATED WITH TYPE 2 DIABETES MELLITUS (HCC): ICD-10-CM

## 2021-10-25 DIAGNOSIS — N18.32 HYPERTENSIVE KIDNEY DISEASE WITH STAGE 3B CHRONIC KIDNEY DISEASE (HCC): ICD-10-CM

## 2021-10-25 DIAGNOSIS — I12.9 HYPERTENSIVE KIDNEY DISEASE WITH STAGE 3B CHRONIC KIDNEY DISEASE (HCC): ICD-10-CM

## 2021-10-25 DIAGNOSIS — R60.0 LOCALIZED EDEMA: ICD-10-CM

## 2021-10-25 DIAGNOSIS — E83.52 HYPERCALCEMIA: ICD-10-CM

## 2021-10-25 PROCEDURE — 99214 OFFICE O/P EST MOD 30 MIN: CPT | Performed by: INTERNAL MEDICINE

## 2021-10-25 RX ORDER — ORAL SEMAGLUTIDE 7 MG/1
TABLET ORAL DAILY
COMMUNITY

## 2021-10-25 RX ORDER — METOPROLOL TARTRATE 50 MG/1
50 TABLET, FILM COATED ORAL 2 TIMES DAILY
Start: 2021-10-25

## 2021-10-29 ENCOUNTER — APPOINTMENT (OUTPATIENT)
Dept: LAB | Facility: MEDICAL CENTER | Age: 71
End: 2021-10-29
Payer: MEDICARE

## 2021-10-29 DIAGNOSIS — R05.8 MILLERS' COUGH: ICD-10-CM

## 2021-10-29 PROCEDURE — 86738 MYCOPLASMA ANTIBODY: CPT

## 2021-10-30 LAB
M PNEUMO IGG SER IA-ACNC: 117 U/ML (ref 0–99)
M PNEUMO IGM SER IA-ACNC: <770 U/ML (ref 0–769)

## 2021-11-22 ENCOUNTER — APPOINTMENT (OUTPATIENT)
Dept: LAB | Facility: MEDICAL CENTER | Age: 71
End: 2021-11-22
Payer: MEDICARE

## 2021-11-22 DIAGNOSIS — N18.32 STAGE 3B CHRONIC KIDNEY DISEASE (HCC): ICD-10-CM

## 2021-11-22 LAB
ANION GAP SERPL CALCULATED.3IONS-SCNC: 6 MMOL/L (ref 4–13)
BUN SERPL-MCNC: 24 MG/DL (ref 5–25)
CALCIUM SERPL-MCNC: 9.6 MG/DL (ref 8.3–10.1)
CHLORIDE SERPL-SCNC: 104 MMOL/L (ref 100–108)
CO2 SERPL-SCNC: 28 MMOL/L (ref 21–32)
CREAT SERPL-MCNC: 1.39 MG/DL (ref 0.6–1.3)
GFR SERPL CREATININE-BSD FRML MDRD: 38 ML/MIN/1.73SQ M
GLUCOSE P FAST SERPL-MCNC: 133 MG/DL (ref 65–99)
POTASSIUM SERPL-SCNC: 4.5 MMOL/L (ref 3.5–5.3)
PTH-INTACT SERPL-MCNC: 24.9 PG/ML (ref 18.4–80.1)
SODIUM SERPL-SCNC: 138 MMOL/L (ref 136–145)

## 2021-11-22 PROCEDURE — 80048 BASIC METABOLIC PNL TOTAL CA: CPT

## 2021-11-22 PROCEDURE — 36415 COLL VENOUS BLD VENIPUNCTURE: CPT

## 2021-11-22 PROCEDURE — 83970 ASSAY OF PARATHORMONE: CPT

## 2022-02-28 ENCOUNTER — TELEPHONE (OUTPATIENT)
Dept: NEPHROLOGY | Facility: CLINIC | Age: 72
End: 2022-02-28

## 2022-02-28 DIAGNOSIS — N18.32 STAGE 3B CHRONIC KIDNEY DISEASE (HCC): Primary | ICD-10-CM

## 2022-03-01 ENCOUNTER — APPOINTMENT (OUTPATIENT)
Dept: LAB | Facility: MEDICAL CENTER | Age: 72
End: 2022-03-01
Payer: MEDICARE

## 2022-03-01 DIAGNOSIS — N18.9 CHRONIC KIDNEY DISEASE, UNSPECIFIED CKD STAGE: ICD-10-CM

## 2022-03-01 DIAGNOSIS — E11.29 TYPE 2 DIABETES MELLITUS WITH OTHER KIDNEY COMPLICATION, UNSPECIFIED WHETHER LONG TERM INSULIN USE (HCC): ICD-10-CM

## 2022-03-01 DIAGNOSIS — E03.9 HYPOTHYROIDISM, UNSPECIFIED TYPE: ICD-10-CM

## 2022-03-01 LAB
ALBUMIN SERPL BCP-MCNC: 3.5 G/DL (ref 3.5–5)
ALP SERPL-CCNC: 85 U/L (ref 46–116)
ALT SERPL W P-5'-P-CCNC: 42 U/L (ref 12–78)
ANION GAP SERPL CALCULATED.3IONS-SCNC: 6 MMOL/L (ref 4–13)
AST SERPL W P-5'-P-CCNC: 31 U/L (ref 5–45)
BASOPHILS # BLD AUTO: 0.02 THOUSANDS/ΜL (ref 0–0.1)
BASOPHILS NFR BLD AUTO: 0 % (ref 0–1)
BILIRUB SERPL-MCNC: 0.6 MG/DL (ref 0.2–1)
BUN SERPL-MCNC: 27 MG/DL (ref 5–25)
CALCIUM SERPL-MCNC: 9.6 MG/DL (ref 8.3–10.1)
CHLORIDE SERPL-SCNC: 103 MMOL/L (ref 100–108)
CO2 SERPL-SCNC: 27 MMOL/L (ref 21–32)
CREAT SERPL-MCNC: 1.26 MG/DL (ref 0.6–1.3)
EOSINOPHIL # BLD AUTO: 0.13 THOUSAND/ΜL (ref 0–0.61)
EOSINOPHIL NFR BLD AUTO: 2 % (ref 0–6)
ERYTHROCYTE [DISTWIDTH] IN BLOOD BY AUTOMATED COUNT: 12.2 % (ref 11.6–15.1)
GFR SERPL CREATININE-BSD FRML MDRD: 42 ML/MIN/1.73SQ M
GLUCOSE P FAST SERPL-MCNC: 166 MG/DL (ref 65–99)
HCT VFR BLD AUTO: 40.5 % (ref 34.8–46.1)
HGB BLD-MCNC: 14 G/DL (ref 11.5–15.4)
IMM GRANULOCYTES # BLD AUTO: 0.02 THOUSAND/UL (ref 0–0.2)
IMM GRANULOCYTES NFR BLD AUTO: 0 % (ref 0–2)
LYMPHOCYTES # BLD AUTO: 2.55 THOUSANDS/ΜL (ref 0.6–4.47)
LYMPHOCYTES NFR BLD AUTO: 37 % (ref 14–44)
MCH RBC QN AUTO: 33.4 PG (ref 26.8–34.3)
MCHC RBC AUTO-ENTMCNC: 34.6 G/DL (ref 31.4–37.4)
MCV RBC AUTO: 97 FL (ref 82–98)
MONOCYTES # BLD AUTO: 0.52 THOUSAND/ΜL (ref 0.17–1.22)
MONOCYTES NFR BLD AUTO: 8 % (ref 4–12)
NEUTROPHILS # BLD AUTO: 3.63 THOUSANDS/ΜL (ref 1.85–7.62)
NEUTS SEG NFR BLD AUTO: 53 % (ref 43–75)
NRBC BLD AUTO-RTO: 0 /100 WBCS
PLATELET # BLD AUTO: 130 THOUSANDS/UL (ref 149–390)
PMV BLD AUTO: 13 FL (ref 8.9–12.7)
POTASSIUM SERPL-SCNC: 4.5 MMOL/L (ref 3.5–5.3)
PROT SERPL-MCNC: 7.4 G/DL (ref 6.4–8.2)
RBC # BLD AUTO: 4.19 MILLION/UL (ref 3.81–5.12)
SODIUM SERPL-SCNC: 136 MMOL/L (ref 136–145)
WBC # BLD AUTO: 6.87 THOUSAND/UL (ref 4.31–10.16)

## 2022-03-01 PROCEDURE — 85025 COMPLETE CBC W/AUTO DIFF WBC: CPT

## 2022-03-01 PROCEDURE — 36415 COLL VENOUS BLD VENIPUNCTURE: CPT

## 2022-03-01 PROCEDURE — 80053 COMPREHEN METABOLIC PANEL: CPT

## 2022-03-15 ENCOUNTER — TELEPHONE (OUTPATIENT)
Dept: NEPHROLOGY | Facility: CLINIC | Age: 72
End: 2022-03-15

## 2022-03-15 NOTE — TELEPHONE ENCOUNTER
Prior auth form filled out for lcosapent medication  Sent the form, along with recent progress note and lab work to [employer]  PCP: Dr. Raad Mathis

## 2022-06-09 ENCOUNTER — APPOINTMENT (OUTPATIENT)
Dept: LAB | Facility: MEDICAL CENTER | Age: 72
End: 2022-06-09
Payer: MEDICARE

## 2022-06-09 DIAGNOSIS — E11.22 TYPE 2 DIABETES MELLITUS WITH STAGE 3 CHRONIC KIDNEY DISEASE, UNSPECIFIED WHETHER LONG TERM INSULIN USE, UNSPECIFIED WHETHER STAGE 3A OR 3B CKD (HCC): ICD-10-CM

## 2022-06-09 DIAGNOSIS — N18.32 STAGE 3B CHRONIC KIDNEY DISEASE (HCC): ICD-10-CM

## 2022-06-09 DIAGNOSIS — N18.30 TYPE 2 DIABETES MELLITUS WITH STAGE 3 CHRONIC KIDNEY DISEASE, UNSPECIFIED WHETHER LONG TERM INSULIN USE, UNSPECIFIED WHETHER STAGE 3A OR 3B CKD (HCC): ICD-10-CM

## 2022-06-09 LAB
ALBUMIN SERPL BCP-MCNC: 3.5 G/DL (ref 3.5–5)
ALP SERPL-CCNC: 82 U/L (ref 46–116)
ALT SERPL W P-5'-P-CCNC: 44 U/L (ref 12–78)
ANION GAP SERPL CALCULATED.3IONS-SCNC: 8 MMOL/L (ref 4–13)
AST SERPL W P-5'-P-CCNC: 38 U/L (ref 5–45)
BILIRUB SERPL-MCNC: 0.66 MG/DL (ref 0.2–1)
BUN SERPL-MCNC: 34 MG/DL (ref 5–25)
CALCIUM SERPL-MCNC: 9.5 MG/DL (ref 8.3–10.1)
CHLORIDE SERPL-SCNC: 106 MMOL/L (ref 100–108)
CO2 SERPL-SCNC: 26 MMOL/L (ref 21–32)
CREAT SERPL-MCNC: 1.6 MG/DL (ref 0.6–1.3)
GFR SERPL CREATININE-BSD FRML MDRD: 31 ML/MIN/1.73SQ M
GLUCOSE P FAST SERPL-MCNC: 94 MG/DL (ref 65–99)
POTASSIUM SERPL-SCNC: 3.8 MMOL/L (ref 3.5–5.3)
PROT SERPL-MCNC: 7.4 G/DL (ref 6.4–8.2)
SODIUM SERPL-SCNC: 140 MMOL/L (ref 136–145)

## 2022-06-09 PROCEDURE — 80053 COMPREHEN METABOLIC PANEL: CPT

## 2022-06-09 PROCEDURE — 36415 COLL VENOUS BLD VENIPUNCTURE: CPT

## 2022-07-05 ENCOUNTER — APPOINTMENT (OUTPATIENT)
Dept: LAB | Facility: MEDICAL CENTER | Age: 72
End: 2022-07-05
Payer: MEDICARE

## 2022-07-05 DIAGNOSIS — E11.22 TYPE 2 DIABETES MELLITUS WITH DIABETIC CHRONIC KIDNEY DISEASE, UNSPECIFIED CKD STAGE, UNSPECIFIED WHETHER LONG TERM INSULIN USE (HCC): ICD-10-CM

## 2022-07-05 DIAGNOSIS — N18.9 CHRONIC KIDNEY DISEASE, UNSPECIFIED CKD STAGE: ICD-10-CM

## 2022-07-05 LAB
ALBUMIN SERPL BCP-MCNC: 3.7 G/DL (ref 3.5–5)
ALP SERPL-CCNC: 78 U/L (ref 46–116)
ALT SERPL W P-5'-P-CCNC: 46 U/L (ref 12–78)
ANION GAP SERPL CALCULATED.3IONS-SCNC: 9 MMOL/L (ref 4–13)
AST SERPL W P-5'-P-CCNC: 34 U/L (ref 5–45)
BILIRUB SERPL-MCNC: 0.71 MG/DL (ref 0.2–1)
BUN SERPL-MCNC: 36 MG/DL (ref 5–25)
CALCIUM SERPL-MCNC: 9.6 MG/DL (ref 8.3–10.1)
CHLORIDE SERPL-SCNC: 101 MMOL/L (ref 100–108)
CO2 SERPL-SCNC: 26 MMOL/L (ref 21–32)
CREAT SERPL-MCNC: 1.62 MG/DL (ref 0.6–1.3)
ERYTHROCYTE [DISTWIDTH] IN BLOOD BY AUTOMATED COUNT: 12.8 % (ref 11.6–15.1)
GFR SERPL CREATININE-BSD FRML MDRD: 31 ML/MIN/1.73SQ M
GLUCOSE P FAST SERPL-MCNC: 121 MG/DL (ref 65–99)
HCT VFR BLD AUTO: 39.6 % (ref 34.8–46.1)
HGB BLD-MCNC: 13.7 G/DL (ref 11.5–15.4)
MCH RBC QN AUTO: 34 PG (ref 26.8–34.3)
MCHC RBC AUTO-ENTMCNC: 34.6 G/DL (ref 31.4–37.4)
MCV RBC AUTO: 98 FL (ref 82–98)
PLATELET # BLD AUTO: 147 THOUSANDS/UL (ref 149–390)
PMV BLD AUTO: 12.4 FL (ref 8.9–12.7)
POTASSIUM SERPL-SCNC: 3.6 MMOL/L (ref 3.5–5.3)
PROT SERPL-MCNC: 7.6 G/DL (ref 6.4–8.2)
RBC # BLD AUTO: 4.03 MILLION/UL (ref 3.81–5.12)
SODIUM SERPL-SCNC: 136 MMOL/L (ref 136–145)
WBC # BLD AUTO: 6.49 THOUSAND/UL (ref 4.31–10.16)

## 2022-07-05 PROCEDURE — 36415 COLL VENOUS BLD VENIPUNCTURE: CPT

## 2022-07-05 PROCEDURE — 80053 COMPREHEN METABOLIC PANEL: CPT

## 2022-07-05 PROCEDURE — 85027 COMPLETE CBC AUTOMATED: CPT

## 2022-07-08 ENCOUNTER — OFFICE VISIT (OUTPATIENT)
Dept: NEPHROLOGY | Facility: CLINIC | Age: 72
End: 2022-07-08
Payer: MEDICARE

## 2022-07-08 VITALS
HEIGHT: 64 IN | DIASTOLIC BLOOD PRESSURE: 74 MMHG | BODY MASS INDEX: 38.24 KG/M2 | OXYGEN SATURATION: 99 % | SYSTOLIC BLOOD PRESSURE: 130 MMHG | HEART RATE: 60 BPM | WEIGHT: 224 LBS

## 2022-07-08 DIAGNOSIS — E66.01 OBESITY, MORBID (HCC): ICD-10-CM

## 2022-07-08 DIAGNOSIS — E11.21 DIABETIC NEPHROPATHY ASSOCIATED WITH TYPE 2 DIABETES MELLITUS (HCC): ICD-10-CM

## 2022-07-08 DIAGNOSIS — N18.32 HYPERTENSIVE KIDNEY DISEASE WITH STAGE 3B CHRONIC KIDNEY DISEASE (HCC): ICD-10-CM

## 2022-07-08 DIAGNOSIS — N18.32 STAGE 3B CHRONIC KIDNEY DISEASE (HCC): Primary | ICD-10-CM

## 2022-07-08 DIAGNOSIS — N17.9 AKI (ACUTE KIDNEY INJURY) (HCC): ICD-10-CM

## 2022-07-08 DIAGNOSIS — I12.9 HYPERTENSIVE KIDNEY DISEASE WITH STAGE 3B CHRONIC KIDNEY DISEASE (HCC): ICD-10-CM

## 2022-07-08 PROCEDURE — 99215 OFFICE O/P EST HI 40 MIN: CPT | Performed by: INTERNAL MEDICINE

## 2022-07-08 RX ORDER — EZETIMIBE 10 MG/1
10 TABLET ORAL DAILY
COMMUNITY

## 2022-07-08 RX ORDER — FINERENONE 10 MG/1
10 TABLET, FILM COATED ORAL DAILY
COMMUNITY
Start: 2022-04-21

## 2022-07-08 RX ORDER — SIMVASTATIN 80 MG
80 TABLET ORAL
COMMUNITY

## 2022-07-12 PROBLEM — N17.9 AKI (ACUTE KIDNEY INJURY) (HCC): Status: ACTIVE | Noted: 2022-07-12

## 2022-07-12 NOTE — ASSESSMENT & PLAN NOTE
Lab Results   Component Value Date    EGFR 31 07/05/2022    EGFR 31 06/09/2022    EGFR 42 03/01/2022    CREATININE 1 62 (H) 07/05/2022    CREATININE 1 60 (H) 06/09/2022    CREATININE 1 26 03/01/2022       Patient baseline creatinine typically between 1 3-1 4 mg/ dL  Will follow-up labs as mentioned above  Continue to avoid potential nephrotoxins

## 2022-07-12 NOTE — ASSESSMENT & PLAN NOTE
Lab Results   Component Value Date    EGFR 31 07/05/2022    EGFR 31 06/09/2022    EGFR 42 03/01/2022    CREATININE 1 62 (H) 07/05/2022    CREATININE 1 60 (H) 06/09/2022    CREATININE 1 26 03/01/2022       Blood pressure is well controlled at this time, continue current medications

## 2022-07-12 NOTE — ASSESSMENT & PLAN NOTE
Patient appears to have mild acute kidney injury  Potentially related to volume versus medication effect from newly initiated [place]  I recommended for the patient to continue with all medications, and then reassess labs in the near future  My hope is that the patient is able to compensate from hemodynamic standpoint and kidney function improved  In the meantime she will keep a close eye on her blood pressures  Patient continue to avoid all NSAIDs as well  Additional testing and imaging may be indicated going forward to ensure there is no other potential cause for the reduction in kidney function  Will need to weigh the potential risks versus benefits of continuing this medication if the patient's creatinine remains at this current level

## 2022-07-12 NOTE — ASSESSMENT & PLAN NOTE
Continue to encourage weight loss  The patient is focused on reducing caloric intake and steady weight reduction going forward

## 2022-07-12 NOTE — ASSESSMENT & PLAN NOTE
Patient currently on losartan, Farxiga, and Theoplis Jose to assist from the diabetic nephropathy standpoint  Continue to optimize blood sugar control management        Lab Results   Component Value Date    HGBA1C 7 0 (H) 11/24/2020

## 2022-07-12 NOTE — PROGRESS NOTES
[facility] [place], [place]    Name: Abimael White  YOB: 1950      Assessment/Plan:    RUY (acute kidney injury) [facility])    Patient appears to have mild acute kidney injury  Potentially related to volume versus medication effect from newly initiated [place]  I recommended for the patient to continue with all medications, and then reassess labs in the near future  My hope is that the patient is able to compensate from hemodynamic standpoint and kidney function improved  In the meantime she will keep a close eye on her blood pressures  Patient continue to avoid all NSAIDs as well  Additional testing and imaging may be indicated going forward to ensure there is no other potential cause for the reduction in kidney function  Will need to weigh the potential risks versus benefits of continuing this medication if the patient's creatinine remains at this current level  Stage 3 chronic kidney disease [facility])  Lab Results   Component Value Date    EGFR 31 07/05/2022    EGFR 31 06/09/2022    EGFR 42 03/01/2022    CREATININE 1 62 (H) 07/05/2022    CREATININE 1 60 (H) 06/09/2022    CREATININE 1 26 03/01/2022       Patient baseline creatinine typically between 1 3-1 4 mg/ dL  Will follow-up labs as mentioned above  Continue to avoid potential nephrotoxins  Hypertensive kidney disease with stage 3 chronic kidney disease ([facility] )  Lab Results   Component Value Date    EGFR 31 07/05/2022    EGFR 31 06/09/2022    EGFR 42 03/01/2022    CREATININE 1 62 (H) 07/05/2022    CREATININE 1 60 (H) 06/09/2022    CREATININE 1 26 03/01/2022       Blood pressure is well controlled at this time, continue current medications  Diabetic nephropathy associated with type 2 diabetes mellitus ([facility] )    Patient currently on losartan, Kristy Hash, and [place] to assist from the diabetic nephropathy standpoint  Continue to optimize blood sugar control management        Lab Results Component Value Date    HGBA1C 7 0 (H) 11/24/2020       Obesity, morbid ([facility] )  Continue to encourage weight loss  The patient is focused on reducing caloric intake and steady weight reduction going forward  Problem List Items Addressed This Visit        Endocrine    Diabetic nephropathy associated with type 2 diabetes mellitus ([facility] )       Patient currently on losartan, [place], and Parisa Led to assist from the diabetic nephropathy standpoint  Continue to optimize blood sugar control management  Lab Results   Component Value Date    HGBA1C 7 0 (H) 11/24/2020              Relevant Medications    Dapagliflozin Propanediol (Farxiga) 5 MG TABS       Genitourinary    Stage 3 chronic kidney disease ([place] [address] ) - Primary     Lab Results   Component Value Date    EGFR 31 07/05/2022    EGFR 31 06/09/2022    EGFR 42 03/01/2022    CREATININE 1 62 (H) 07/05/2022    CREATININE 1 60 (H) 06/09/2022    CREATININE 1 26 03/01/2022       Patient baseline creatinine typically between 1 3-1 4 mg/ dL  Will follow-up labs as mentioned above  Continue to avoid potential nephrotoxins  Relevant Orders    Basic metabolic panel    Magnesium    Phosphorus    PTH, intact    Microalbumin / creatinine urine ratio    Urinalysis with microscopic    Hypertensive kidney disease with stage 3 chronic kidney disease (HCC)     Lab Results   Component Value Date    EGFR 31 07/05/2022    EGFR 31 06/09/2022    EGFR 42 03/01/2022    CREATININE 1 62 (H) 07/05/2022    CREATININE 1 60 (H) 06/09/2022    CREATININE 1 26 03/01/2022       Blood pressure is well controlled at this time, continue current medications  RUY (acute kidney injury) [facility])       Patient appears to have mild acute kidney injury  Potentially related to volume versus medication effect from newly initiated [place]a Led  I recommended for the patient to continue with all medications, and then reassess labs in the near future    My hope is that the patient is able to compensate from hemodynamic standpoint and kidney function improved  In the meantime she will keep a close eye on her blood pressures  Patient continue to avoid all NSAIDs as well  Additional testing and imaging may be indicated going forward to ensure there is no other potential cause for the reduction in kidney function  Will need to weigh the potential risks versus benefits of continuing this medication if the patient's creatinine remains at this current level  Other    Obesity, morbid ([address] )     Continue to encourage weight loss  The patient is focused on reducing caloric intake and steady weight reduction going forward  Patient currently in mild acute kidney injury  As mentioned above will check labs in near future to reassess  Additional testing as well as imaging may be indicated depending on the patient progresses  Will need to continue to closely monitor this over the next several weeks  Subjective:      Patient ID: Bhupinder Jacobs is a 67 y o  female  Patient presents to the office for follow-up appointment  Reviewed the patient's most recent labs in detail, which showed a significantly elevated creatinine above prior check but stable compared to the 1 from last month at around 1 6 mg/ dL  Previously the patient's creatinine has been closer to 1 3-1 4 mg/ dL  There are no significant electrolyte abnormalities noted on laboratory  Patient denies use of nonsteroidal anti-inflammatory medications  Since the patient's blood work was obtained which noted the increase in creatinine, she has been started on a new medication, [place], which is known to potentially increase creatinine  Patient has been eating and drinking normally and denies nausea vomiting or diarrhea  Patient is taking all medications with no specific side effects at this time  Hypertension  This is a chronic problem  The current episode started more than 1 year ago   The problem is unchanged  The problem is controlled  Pertinent negatives include no chest pain, orthopnea or peripheral edema  There are no associated agents to hypertension  Risk factors for coronary artery disease include post-menopausal state, obesity and sedentary lifestyle  Past treatments include angiotensin blockers, lifestyle changes, diuretics and beta blockers  There are no compliance problems  Hypertensive end-organ damage includes kidney disease  Identifiable causes of hypertension include chronic renal disease  The following portions of the patient's history were reviewed and updated as appropriate: allergies, current medications, past family history, past medical history, past social history, past surgical history and problem list     Review of Systems   Cardiovascular: Negative for chest pain and orthopnea  All other systems reviewed and are negative          Social History     Socioeconomic History    Marital status: /Civil Union     Spouse name: None    Number of children: None    Years of education: None    Highest education level: None   Occupational History    Occupation: Retired -     Tobacco Use    Smoking status: Never Smoker    Smokeless tobacco: Never Used   Substance and Sexual Activity    Alcohol use: No    Drug use: No    Sexual activity: None   Other Topics Concern    None   Social History Narrative    Consumes on average 1 regular cup of coffee per day    Consumes on average 8 oz of soda per day - diet      Social Determinants of Health     Financial Resource Strain: Not on file   Food Insecurity: Not on file   Transportation Needs: Not on file   Physical Activity: Not on file   Stress: Not on file   Social Connections: Not on file   Intimate Partner Violence: Not on file   Housing Stability: Not on file     Past Medical History:   Diagnosis Date    Benign essential hypertension     Cataracts, bilateral     s/p bilateratl extraction and lens implant     Chronic kidney disease, stage 3 (HCC)     Edema     Essential hypertension     Herpes zoster     Left upper chest    Hyperlipidemia     Hypothyroidism     Myocardial infarction ([facility] )     Proteinuria     Renal failure syndrome     Type 2 diabetes mellitus ([facility] )     Type II diabetes mellitus, uncontrolled     Vitamin D deficiency      Past Surgical History:   Procedure Laterality Date    BREAST SURGERY  1996    Lumpectomy - Benign     CATARACT EXTRACTION, BILATERAL  12/2015    HYSTERECTOMY      Total     JOINT REPLACEMENT Left 2012    Knee     JOINT REPLACEMENT Right 01/19/2017    TKR       Current Outpatient Medications:     Alphagan P 0 1 %, , Disp: , Rfl:     aspirin 81 MG tablet, Take by mouth, Disp: , Rfl:     B Complex-Folic Acid (SUPER B COMPLEX MAXI) TABS, Take by mouth, Disp: , Rfl:     Calcium-Vitamin D 500-125 MG-UNIT TABS, Take 1,000 mg by mouth daily, Disp: , Rfl:     Cholecalciferol (VITAMIN D3) 5000 units CAPS, Take by mouth once a week Takes 3 tablets one day a week , Disp: , Rfl:     Dapagliflozin Propanediol (Farxiga) 5 MG TABS, Take 5 mg by mouth daily, Disp: , Rfl:     ezetimibe (ZETIA) 10 mg tablet, Take 10 mg by mouth daily, Disp: , Rfl:     glucose blood (FREESTYLE LITE) test strip, 1 each by Other route 4 (four) times a day, Disp: 100 each, Rfl: 5    Icosapent Ethyl 1 g CAPS, Take 2 capsules (2 g total) by mouth 2 (two) times a day, Disp: 120 capsule, Rfl: 5    insulin lispro (HumaLOG) 100 units/mL injection, Inject under the skin, Disp: , Rfl:     Insulin Pen Needle 31G X 8 MM MISC, by Does not apply route, Disp: , Rfl:     Kerendia 10 MG TABS, Take 10 mg by mouth daily, Disp: , Rfl:     levothyroxine 100 mcg tablet, Take 1 tablet by mouth daily, Disp: , Rfl:     losartan (COZAAR) 100 MG tablet, Take 100 mg by mouth daily, Disp: , Rfl:     metoprolol tartrate (LOPRESSOR) 50 mg tablet, Take 1 tablet (50 mg total) by mouth 2 (two) times a day, Disp: , Rfl:    Semaglutide (Rybelsus) 7 MG TABS, Take by mouth daily, Disp: , Rfl:     simvastatin (ZOCOR) 80 mg tablet, Take 80 mg by mouth daily at bedtime, Disp: , Rfl:     Lab Results   Component Value Date     06/16/2015    SODIUM 136 07/05/2022    K 3 6 07/05/2022     07/05/2022    CO2 26 07/05/2022    ANIONGAP 10 06/16/2015    AGAP 9 07/05/2022    BUN 36 (H) 07/05/2022    CREATININE 1 62 (H) 07/05/2022    GLUC 112 02/24/2017    GLUF 121 (H) 07/05/2022    CALCIUM 9 6 07/05/2022    AST 34 07/05/2022    ALT 46 07/05/2022    ALKPHOS 78 07/05/2022    PROT 7 3 05/07/2015    TP 7 6 07/05/2022    BILITOT 0 5 05/07/2015    TBILI 0 71 07/05/2022    EGFR 31 07/05/2022     Lab Results   Component Value Date    WBC 6 49 07/05/2022    HGB 13 7 07/05/2022    HCT 39 6 07/05/2022    MCV 98 07/05/2022     (L) 07/05/2022     Lab Results   Component Value Date    CHOLESTEROL 181 04/16/2021    CHOLESTEROL 170 10/20/2020    CHOLESTEROL 199 04/20/2020     Lab Results   Component Value Date    HDL 41 04/16/2021    HDL 43 10/20/2020    HDL 36 (L) 04/20/2020     Lab Results   Component Value Date    LDLCALC 87 04/16/2021    LDLCALC 88 10/20/2020    [address]  04/20/2020      Comment:      Calculated LDL invalid, triglycerides >400 mg/dl  This screening LDL is a calculated result  It does not have the accuracy of the Direct Measured LDL in the monitoring of patients with hyperlipidemia and/or statin therapy  Direct Measure LDL (FMU025) must be ordered separately in these patients       Lab Results   Component Value Date    TRIG 264 (H) 04/16/2021    TRIG 196 (H) 10/20/2020    TRIG 408 (H) 04/20/2020     No results found for: [place], [place]  Lab Results   Component Value Date    [id number] 2 380 04/16/2021     Lab Results   Component Value Date    PTH 24 9 11/22/2021    CALCIUM 9 6 07/05/2022     No results found for: SPEP, UPEP  No results found for: KATHLEEN GARCIAS        Objective:      /74   Pulse 60   Ht 5' 4" (1 626 m)   Wt 102 kg (224 lb)   SpO2 99%   BMI 38 45 kg/m²          Physical Exam  Vitals reviewed  Constitutional:       General: She is not in acute distress  Appearance: She is well-developed  HENT:      Head: Normocephalic and atraumatic  Eyes:      Conjunctiva/sclera: Conjunctivae normal    Cardiovascular:      Rate and Rhythm: Normal rate and regular rhythm  Pulmonary:      Effort: Pulmonary effort is normal       Breath sounds: Normal breath sounds  Abdominal:      Palpations: Abdomen is soft  Musculoskeletal:      Cervical back: Neck supple  Skin:     General: Skin is warm  Findings: No rash  Neurological:      Mental Status: She is alert and oriented to person, place, and time  Cranial Nerves: No cranial nerve deficit     Psychiatric:         Behavior: Behavior normal

## 2022-08-08 ENCOUNTER — APPOINTMENT (OUTPATIENT)
Dept: LAB | Facility: MEDICAL CENTER | Age: 72
End: 2022-08-08
Payer: MEDICARE

## 2022-08-08 DIAGNOSIS — N18.32 STAGE 3B CHRONIC KIDNEY DISEASE (HCC): ICD-10-CM

## 2022-08-08 LAB
ANION GAP SERPL CALCULATED.3IONS-SCNC: 7 MMOL/L (ref 4–13)
BACTERIA UR QL AUTO: ABNORMAL /HPF
BILIRUB UR QL STRIP: NEGATIVE
BUN SERPL-MCNC: 32 MG/DL (ref 5–25)
CALCIUM SERPL-MCNC: 9.7 MG/DL (ref 8.3–10.1)
CHLORIDE SERPL-SCNC: 101 MMOL/L (ref 96–108)
CLARITY UR: CLEAR
CO2 SERPL-SCNC: 25 MMOL/L (ref 21–32)
COLOR UR: ABNORMAL
CREAT SERPL-MCNC: 1.38 MG/DL (ref 0.6–1.3)
CREAT UR-MCNC: 193 MG/DL
GFR SERPL CREATININE-BSD FRML MDRD: 37 ML/MIN/1.73SQ M
GLUCOSE P FAST SERPL-MCNC: 123 MG/DL (ref 65–99)
GLUCOSE UR STRIP-MCNC: ABNORMAL MG/DL
HGB UR QL STRIP.AUTO: NEGATIVE
KETONES UR STRIP-MCNC: NEGATIVE MG/DL
LEUKOCYTE ESTERASE UR QL STRIP: ABNORMAL
MAGNESIUM SERPL-MCNC: 2 MG/DL (ref 1.6–2.6)
MICROALBUMIN UR-MCNC: 104 MG/L (ref 0–20)
MICROALBUMIN/CREAT 24H UR: 54 MG/G CREATININE (ref 0–30)
NITRITE UR QL STRIP: NEGATIVE
NON-SQ EPI CELLS URNS QL MICRO: ABNORMAL /HPF
PH UR STRIP.AUTO: 6 [PH]
PHOSPHATE SERPL-MCNC: 2.6 MG/DL (ref 2.3–4.1)
POTASSIUM SERPL-SCNC: 3.8 MMOL/L (ref 3.5–5.3)
PROT UR STRIP-MCNC: ABNORMAL MG/DL
PTH-INTACT SERPL-MCNC: 24.9 PG/ML (ref 18.4–80.1)
RBC #/AREA URNS AUTO: ABNORMAL /HPF
SODIUM SERPL-SCNC: 133 MMOL/L (ref 135–147)
SP GR UR STRIP.AUTO: 1.02 (ref 1–1.03)
TRANS CELLS #/AREA URNS HPF: PRESENT /[HPF]
UROBILINOGEN UR STRIP-ACNC: <2 MG/DL
WBC #/AREA URNS AUTO: ABNORMAL /HPF

## 2022-08-08 PROCEDURE — 82043 UR ALBUMIN QUANTITATIVE: CPT

## 2022-08-08 PROCEDURE — 83735 ASSAY OF MAGNESIUM: CPT

## 2022-08-08 PROCEDURE — 83970 ASSAY OF PARATHORMONE: CPT

## 2022-08-08 PROCEDURE — 82570 ASSAY OF URINE CREATININE: CPT

## 2022-08-08 PROCEDURE — 81001 URINALYSIS AUTO W/SCOPE: CPT

## 2022-08-08 PROCEDURE — 80048 BASIC METABOLIC PNL TOTAL CA: CPT

## 2022-08-08 PROCEDURE — 84100 ASSAY OF PHOSPHORUS: CPT

## 2022-10-12 ENCOUNTER — APPOINTMENT (OUTPATIENT)
Dept: LAB | Facility: MEDICAL CENTER | Age: 72
End: 2022-10-12
Payer: MEDICARE

## 2022-10-12 DIAGNOSIS — E11.29 TYPE 2 DIABETES MELLITUS WITH OTHER KIDNEY COMPLICATION, UNSPECIFIED WHETHER LONG TERM INSULIN USE (HCC): ICD-10-CM

## 2022-10-12 LAB
ALBUMIN SERPL BCP-MCNC: 3.7 G/DL (ref 3.5–5)
ALP SERPL-CCNC: 95 U/L (ref 46–116)
ALT SERPL W P-5'-P-CCNC: 36 U/L (ref 12–78)
ANION GAP SERPL CALCULATED.3IONS-SCNC: 7 MMOL/L (ref 4–13)
AST SERPL W P-5'-P-CCNC: 27 U/L (ref 5–45)
BILIRUB SERPL-MCNC: 0.58 MG/DL (ref 0.2–1)
BUN SERPL-MCNC: 37 MG/DL (ref 5–25)
CALCIUM SERPL-MCNC: 9.7 MG/DL (ref 8.3–10.1)
CHLORIDE SERPL-SCNC: 101 MMOL/L (ref 96–108)
CO2 SERPL-SCNC: 26 MMOL/L (ref 21–32)
CREAT SERPL-MCNC: 1.71 MG/DL (ref 0.6–1.3)
GFR SERPL CREATININE-BSD FRML MDRD: 29 ML/MIN/1.73SQ M
GLUCOSE P FAST SERPL-MCNC: 152 MG/DL (ref 65–99)
POTASSIUM SERPL-SCNC: 3.7 MMOL/L (ref 3.5–5.3)
PROT SERPL-MCNC: 7.9 G/DL (ref 6.4–8.4)
SODIUM SERPL-SCNC: 134 MMOL/L (ref 135–147)

## 2022-10-12 PROCEDURE — 36415 COLL VENOUS BLD VENIPUNCTURE: CPT

## 2022-10-12 PROCEDURE — 80053 COMPREHEN METABOLIC PANEL: CPT

## 2022-11-16 ENCOUNTER — APPOINTMENT (OUTPATIENT)
Dept: LAB | Facility: MEDICAL CENTER | Age: 72
End: 2022-11-16

## 2022-11-16 DIAGNOSIS — E13.29: ICD-10-CM

## 2022-11-16 LAB
ALBUMIN SERPL BCP-MCNC: 3.6 G/DL (ref 3.5–5)
ALP SERPL-CCNC: 87 U/L (ref 46–116)
ALT SERPL W P-5'-P-CCNC: 33 U/L (ref 12–78)
ANION GAP SERPL CALCULATED.3IONS-SCNC: 6 MMOL/L (ref 4–13)
AST SERPL W P-5'-P-CCNC: 26 U/L (ref 5–45)
BILIRUB SERPL-MCNC: 0.72 MG/DL (ref 0.2–1)
BUN SERPL-MCNC: 27 MG/DL (ref 5–25)
CALCIUM SERPL-MCNC: 9.6 MG/DL (ref 8.3–10.1)
CHLORIDE SERPL-SCNC: 104 MMOL/L (ref 96–108)
CO2 SERPL-SCNC: 27 MMOL/L (ref 21–32)
CREAT SERPL-MCNC: 1.46 MG/DL (ref 0.6–1.3)
GFR SERPL CREATININE-BSD FRML MDRD: 35 ML/MIN/1.73SQ M
GLUCOSE P FAST SERPL-MCNC: 96 MG/DL (ref 65–99)
POTASSIUM SERPL-SCNC: 3.5 MMOL/L (ref 3.5–5.3)
PROT SERPL-MCNC: 8.1 G/DL (ref 6.4–8.4)
SODIUM SERPL-SCNC: 137 MMOL/L (ref 135–147)

## 2023-01-23 ENCOUNTER — TELEPHONE (OUTPATIENT)
Dept: NEPHROLOGY | Facility: CLINIC | Age: 73
End: 2023-01-23

## 2023-01-23 DIAGNOSIS — N18.32 STAGE 3B CHRONIC KIDNEY DISEASE (HCC): Primary | ICD-10-CM

## 2023-01-26 ENCOUNTER — TELEPHONE (OUTPATIENT)
Dept: NEPHROLOGY | Facility: CLINIC | Age: 73
End: 2023-01-26

## 2023-01-27 ENCOUNTER — APPOINTMENT (OUTPATIENT)
Dept: LAB | Facility: MEDICAL CENTER | Age: 73
End: 2023-01-27

## 2023-01-27 DIAGNOSIS — N18.32 STAGE 3B CHRONIC KIDNEY DISEASE (HCC): ICD-10-CM

## 2023-01-27 LAB
ALBUMIN SERPL BCP-MCNC: 3.7 G/DL (ref 3.5–5)
ALP SERPL-CCNC: 78 U/L (ref 46–116)
ALT SERPL W P-5'-P-CCNC: 37 U/L (ref 12–78)
ANION GAP SERPL CALCULATED.3IONS-SCNC: 4 MMOL/L (ref 4–13)
AST SERPL W P-5'-P-CCNC: 34 U/L (ref 5–45)
BACTERIA UR QL AUTO: ABNORMAL /HPF
BASOPHILS # BLD AUTO: 0.02 THOUSANDS/ÂΜL (ref 0–0.1)
BASOPHILS NFR BLD AUTO: 0 % (ref 0–1)
BILIRUB SERPL-MCNC: 0.73 MG/DL (ref 0.2–1)
BILIRUB UR QL STRIP: NEGATIVE
BUN SERPL-MCNC: 33 MG/DL (ref 5–25)
CALCIUM SERPL-MCNC: 9.3 MG/DL (ref 8.3–10.1)
CHLORIDE SERPL-SCNC: 102 MMOL/L (ref 96–108)
CLARITY UR: ABNORMAL
CO2 SERPL-SCNC: 29 MMOL/L (ref 21–32)
COLOR UR: ABNORMAL
CREAT SERPL-MCNC: 1.48 MG/DL (ref 0.6–1.3)
CREAT UR-MCNC: 109 MG/DL
EOSINOPHIL # BLD AUTO: 0.17 THOUSAND/ÂΜL (ref 0–0.61)
EOSINOPHIL NFR BLD AUTO: 3 % (ref 0–6)
ERYTHROCYTE [DISTWIDTH] IN BLOOD BY AUTOMATED COUNT: 12.4 % (ref 11.6–15.1)
GFR SERPL CREATININE-BSD FRML MDRD: 34 ML/MIN/1.73SQ M
GLUCOSE P FAST SERPL-MCNC: 135 MG/DL (ref 65–99)
GLUCOSE UR STRIP-MCNC: NEGATIVE MG/DL
HCT VFR BLD AUTO: 41.7 % (ref 34.8–46.1)
HGB BLD-MCNC: 13.8 G/DL (ref 11.5–15.4)
HGB UR QL STRIP.AUTO: NEGATIVE
HYALINE CASTS #/AREA URNS LPF: ABNORMAL /LPF
IMM GRANULOCYTES # BLD AUTO: 0.01 THOUSAND/UL (ref 0–0.2)
IMM GRANULOCYTES NFR BLD AUTO: 0 % (ref 0–2)
KETONES UR STRIP-MCNC: NEGATIVE MG/DL
LEUKOCYTE ESTERASE UR QL STRIP: ABNORMAL
LYMPHOCYTES # BLD AUTO: 2.4 THOUSANDS/ÂΜL (ref 0.6–4.47)
LYMPHOCYTES NFR BLD AUTO: 35 % (ref 14–44)
MAGNESIUM SERPL-MCNC: 1.9 MG/DL (ref 1.6–2.6)
MCH RBC QN AUTO: 33.1 PG (ref 26.8–34.3)
MCHC RBC AUTO-ENTMCNC: 33.1 G/DL (ref 31.4–37.4)
MCV RBC AUTO: 100 FL (ref 82–98)
MICROALBUMIN UR-MCNC: 22.4 MG/L (ref 0–20)
MICROALBUMIN/CREAT 24H UR: 21 MG/G CREATININE (ref 0–30)
MONOCYTES # BLD AUTO: 0.51 THOUSAND/ÂΜL (ref 0.17–1.22)
MONOCYTES NFR BLD AUTO: 7 % (ref 4–12)
MUCOUS THREADS UR QL AUTO: ABNORMAL
NEUTROPHILS # BLD AUTO: 3.74 THOUSANDS/ÂΜL (ref 1.85–7.62)
NEUTS SEG NFR BLD AUTO: 55 % (ref 43–75)
NITRITE UR QL STRIP: NEGATIVE
NON-SQ EPI CELLS URNS QL MICRO: ABNORMAL /HPF
NRBC BLD AUTO-RTO: 0 /100 WBCS
PH UR STRIP.AUTO: 6 [PH]
PHOSPHATE SERPL-MCNC: 3.2 MG/DL (ref 2.3–4.1)
PLATELET # BLD AUTO: 138 THOUSANDS/UL (ref 149–390)
PMV BLD AUTO: 13.6 FL (ref 8.9–12.7)
POTASSIUM SERPL-SCNC: 3.9 MMOL/L (ref 3.5–5.3)
PROT SERPL-MCNC: 7.6 G/DL (ref 6.4–8.4)
PROT UR STRIP-MCNC: NEGATIVE MG/DL
PTH-INTACT SERPL-MCNC: 49.2 PG/ML (ref 18.4–80.1)
RBC # BLD AUTO: 4.17 MILLION/UL (ref 3.81–5.12)
RBC #/AREA URNS AUTO: ABNORMAL /HPF
SODIUM SERPL-SCNC: 135 MMOL/L (ref 135–147)
SP GR UR STRIP.AUTO: 1.01 (ref 1–1.03)
UROBILINOGEN UR STRIP-ACNC: <2 MG/DL
WBC # BLD AUTO: 6.85 THOUSAND/UL (ref 4.31–10.16)
WBC #/AREA URNS AUTO: ABNORMAL /HPF

## 2023-01-31 ENCOUNTER — OFFICE VISIT (OUTPATIENT)
Dept: NEPHROLOGY | Facility: CLINIC | Age: 73
End: 2023-01-31

## 2023-01-31 VITALS
HEART RATE: 74 BPM | WEIGHT: 211.8 LBS | OXYGEN SATURATION: 99 % | DIASTOLIC BLOOD PRESSURE: 88 MMHG | BODY MASS INDEX: 36.16 KG/M2 | HEIGHT: 64 IN | SYSTOLIC BLOOD PRESSURE: 158 MMHG

## 2023-01-31 DIAGNOSIS — E11.21 DIABETIC NEPHROPATHY ASSOCIATED WITH TYPE 2 DIABETES MELLITUS (HCC): ICD-10-CM

## 2023-01-31 DIAGNOSIS — R60.0 BILATERAL LOWER EXTREMITY EDEMA: ICD-10-CM

## 2023-01-31 DIAGNOSIS — E66.01 OBESITY, MORBID (HCC): ICD-10-CM

## 2023-01-31 DIAGNOSIS — N18.32 STAGE 3B CHRONIC KIDNEY DISEASE (HCC): Primary | ICD-10-CM

## 2023-01-31 DIAGNOSIS — I12.9 HYPERTENSIVE KIDNEY DISEASE WITH STAGE 3B CHRONIC KIDNEY DISEASE (HCC): ICD-10-CM

## 2023-01-31 DIAGNOSIS — N18.32 HYPERTENSIVE KIDNEY DISEASE WITH STAGE 3B CHRONIC KIDNEY DISEASE (HCC): ICD-10-CM

## 2023-01-31 NOTE — ASSESSMENT & PLAN NOTE
Patient continues to lose weight, she is now down about 13 pounds since we last saw her about 6 months ago  She was given positive reinforcement for maintaining appropriate diet and increasing physical activity

## 2023-01-31 NOTE — ASSESSMENT & PLAN NOTE
Patient continues to control blood sugars well with current medications  From the renal standpoint, patient had significant reduction in proteinuria, and no longer has an abnormal amount in her urine  This is an excellent finding, continue with [place], and losartan    Lab Results   Component Value Date    HGBA1C 7 0 (H) 11/24/2020

## 2023-01-31 NOTE — ASSESSMENT & PLAN NOTE
Lab Results   Component Value Date    EGFR 34 01/27/2023    EGFR 35 11/16/2022    EGFR 29 10/12/2022    CREATININE 1 48 (H) 01/27/2023    CREATININE 1 46 (H) 11/16/2022    CREATININE 1 71 (H) 10/12/2022     Kidney function remained stable with a creatinine between 1 4-1 5 mg/dL  Continue to optimize care with current medications, avoid potential nephrotoxins

## 2023-01-31 NOTE — ASSESSMENT & PLAN NOTE
Lab Results   Component Value Date    EGFR 34 01/27/2023    EGFR 35 11/16/2022    EGFR 29 10/12/2022    CREATININE 1 48 (H) 01/27/2023    CREATININE 1 46 (H) 11/16/2022    CREATININE 1 71 (H) 10/12/2022     Blood pressure slightly elevated at this time, patient claims home blood pressure is better controlled  Continue current medications  Follow-up blood pressures at home, if they happen to remain elevated will modify medications appropriately

## 2023-01-31 NOTE — PROGRESS NOTES
[facility] [place], [place]    Name: Alicia Bush  YOB: 1950      Assessment/Plan:    Stage 3 chronic kidney disease [facility])  Lab Results   Component Value Date    EGFR 34 01/27/2023    EGFR 35 11/16/2022    EGFR 29 10/12/2022    CREATININE 1 48 (H) 01/27/2023    CREATININE 1 46 (H) 11/16/2022    CREATININE 1 71 (H) 10/12/2022     Kidney function remained stable with a creatinine between 1 4-1 5 mg/dL  Continue to optimize care with current medications, avoid potential nephrotoxins  Diabetic nephropathy associated with type 2 diabetes mellitus ([facility] [address] )  Patient continues to control blood sugars well with current medications  From the renal standpoint, patient had significant reduction in proteinuria, and no longer has an abnormal amount in her urine  This is an excellent finding, continue with Lewanda López, and losartan  Lab Results   Component Value Date    HGBA1C 7 0 (H) 11/24/2020       Hypertensive kidney disease with stage 3 chronic kidney disease (HCC)  Lab Results   Component Value Date    EGFR 34 01/27/2023    EGFR 35 11/16/2022    EGFR 29 10/12/2022    CREATININE 1 48 (H) 01/27/2023    CREATININE 1 46 (H) 11/16/2022    CREATININE 1 71 (H) 10/12/2022     Blood pressure slightly elevated at this time, patient claims home blood pressure is better controlled  Continue current medications  Follow-up blood pressures at home, if they happen to remain elevated will modify medications appropriately  Bilateral lower extremity edema  Continue low-sodium diet, patient is on Farxiga which appears to be helping with the edema  Obesity, morbid ([facility] [address] )  Patient continues to lose weight, she is now down about 13 pounds since we last saw her about 6 months ago  She was given positive reinforcement for maintaining appropriate diet and increasing physical activity           Problem List Items Addressed This Visit        Endocrine    Diabetic nephropathy associated with type 2 diabetes mellitus ([place] [address] )     Patient continues to control blood sugars well with current medications  From the renal standpoint, patient had significant reduction in proteinuria, and no longer has an abnormal amount in her urine  This is an excellent finding, continue with Lizz Crumb, and losartan  Lab Results   Component Value Date    HGBA1C 7 0 (H) 11/24/2020               Genitourinary    Stage 3 chronic kidney disease ([place] [address] ) - Primary     Lab Results   Component Value Date    EGFR 34 01/27/2023    EGFR 35 11/16/2022    EGFR 29 10/12/2022    CREATININE 1 48 (H) 01/27/2023    CREATININE 1 46 (H) 11/16/2022    CREATININE 1 71 (H) 10/12/2022     Kidney function remained stable with a creatinine between 1 4-1 5 mg/dL  Continue to optimize care with current medications, avoid potential nephrotoxins  Relevant Orders    Comprehensive metabolic panel    PTH, intact    Vitamin D 25 hydroxy    Urinalysis with microscopic    Microalbumin / creatinine urine ratio    Hypertensive kidney disease with stage 3 chronic kidney disease ([place] [address] )     Lab Results   Component Value Date    EGFR 34 01/27/2023    EGFR 35 11/16/2022    EGFR 29 10/12/2022    CREATININE 1 48 (H) 01/27/2023    CREATININE 1 46 (H) 11/16/2022    CREATININE 1 71 (H) 10/12/2022     Blood pressure slightly elevated at this time, patient claims home blood pressure is better controlled  Continue current medications  Follow-up blood pressures at home, if they happen to remain elevated will modify medications appropriately  Other    Bilateral lower extremity edema     Continue low-sodium diet, patient is on Farxiga which appears to be helping with the edema  Obesity, morbid ([place] [address] )     Patient continues to lose weight, she is now down about 13 pounds since we last saw her about 6 months ago  She was given positive reinforcement for maintaining appropriate diet and increasing physical activity  BMI 36 0-36 9,adult       Patient is stable for the renal standpoint  We will see her back for regular appointment in 6 months  Blood work to be done prior to that appoint provided during this visit  On a very positive note, for the first time in more than 5 years, the patient has no abnormal albuminuria at this time  Subjective:      Patient ID: Leticia Tompkins is a 75 y o  female  Patient presents for follow up  We reviewed the patient's labs in detail, kidney function is stable with a creatinine of 1 5 mg/dL, no electrolyte issues  Patient is avoiding nonsteroidal anti-inflammatory medications  Patient is taking all medications with no specific side effects at this time  Specifically she is taking [place], losartan, and [place] regularly  Patient's urine albumin/creatinine was within normal limits for the first time in many years  Hypertension  This is a chronic problem  The current episode started more than 1 year ago  The problem is unchanged  The problem is controlled  Associated symptoms include peripheral edema  Pertinent negatives include no chest pain or orthopnea  There are no associated agents to hypertension  Risk factors for coronary artery disease include post-menopausal state, diabetes mellitus and obesity  Past treatments include lifestyle changes, diuretics, angiotensin blockers and beta blockers  There are no compliance problems  Hypertensive end-organ damage includes kidney disease  Identifiable causes of hypertension include chronic renal disease  The following portions of the patient's history were reviewed and updated as appropriate: allergies, current medications, past family history, past medical history, past social history, past surgical history and problem list     Review of Systems   Cardiovascular: Negative for chest pain and orthopnea  All other systems reviewed and are negative          Social History     Socioeconomic History   • Marital status: /Civil [employer]     Spouse name: None   • Number of children: None   • Years of education: None   • Highest education level: None   Occupational History   • Occupation: Retired -     Tobacco Use   • Smoking status: Never   • Smokeless tobacco: Never   Substance and Sexual Activity   • Alcohol use: No   • Drug use: No   • Sexual activity: None   Other Topics Concern   • None   Social History Narrative    Consumes on average 1 regular cup of coffee per day    Consumes on average 8 oz of soda per day - diet      Social Determinants of Health     Financial Resource Strain: Not on file   Food Insecurity: Not on file   Transportation Needs: Not on file   Physical Activity: Not on file   Stress: Not on file   Social Connections: Not on file   Intimate Partner Violence: Not on file   Housing Stability: Not on file     Past Medical History:   Diagnosis Date   • Benign essential hypertension    • Cataracts, bilateral     s/p bilateratl extraction and lens implant    • Chronic kidney disease, stage 3 ([facility] [address] )    • Edema    • Essential hypertension    • Herpes zoster     Left upper chest   • Hyperlipidemia    • Hypothyroidism    • Myocardial infarction ([facility] [address] )    • Proteinuria    • Renal failure syndrome    • Type 2 diabetes mellitus (HCC)    • Type II diabetes mellitus, uncontrolled    • Vitamin D deficiency      Past Surgical History:   Procedure Laterality Date   • BREAST SURGERY  1996    Lumpectomy - Benign    • CATARACT EXTRACTION, BILATERAL  12/2015   • HYSTERECTOMY      Total    • JOINT REPLACEMENT Left 2012    Knee    • JOINT REPLACEMENT Right 01/19/2017    TKR       Current Outpatient Medications:   •  Alphagan P 0 1 %, , Disp: , Rfl:   •  aspirin 81 MG tablet, Take by mouth, Disp: , Rfl:   •  B Complex-Folic Acid (SUPER B COMPLEX MAXI) TABS, Take by mouth, Disp: , Rfl:   •  Calcium-Vitamin D 500-125 MG-UNIT TABS, Take 1,000 mg by mouth daily, Disp: , Rfl:   •  Cholecalciferol (VITAMIN D3) 5000 units CAPS, Take by mouth once a week Takes 3 tablets one day a week , Disp: , Rfl:   •  dapagliflozin 5 MG TABS, Take 5 mg by mouth daily, Disp: , Rfl:   •  ezetimibe (ZETIA) 10 mg tablet, Take 10 mg by mouth daily, Disp: , Rfl:   •  glucose blood (FREESTYLE LITE) test strip, 1 each by Other route 4 (four) times a day, Disp: 100 each, Rfl: 5  •  Icosapent Ethyl 1 g CAPS, Take 2 capsules (2 g total) by mouth 2 (two) times a day, Disp: 120 capsule, Rfl: 5  •  insulin lispro (HumaLOG) 100 units/mL injection, Inject under the skin, Disp: , Rfl:   •  Insulin Pen Needle 31G X 8 MM MISC, by Does not apply route, Disp: , Rfl:   •  Kerendia 10 MG TABS, Take 10 mg by mouth daily, Disp: , Rfl:   •  levothyroxine 100 mcg tablet, Take 1 tablet by mouth daily, Disp: , Rfl:   •  losartan (COZAAR) 100 MG tablet, Take 100 mg by mouth daily, Disp: , Rfl:   •  metoprolol tartrate (LOPRESSOR) 50 mg tablet, Take 1 tablet (50 mg total) by mouth 2 (two) times a day, Disp: , Rfl:   •  Semaglutide (Rybelsus) 7 MG TABS, Take by mouth daily, Disp: , Rfl:   •  simvastatin (ZOCOR) 80 mg tablet, Take 80 mg by mouth daily at bedtime, Disp: , Rfl:     Lab Results   Component Value Date     06/16/2015    SODIUM 135 01/27/2023    K 3 9 01/27/2023     01/27/2023    CO2 29 01/27/2023    ANIONGAP 10 06/16/2015    AGAP 4 01/27/2023    BUN 33 (H) 01/27/2023    CREATININE 1 48 (H) 01/27/2023    GLUC 112 02/24/2017    GLUF 135 (H) 01/27/2023    CALCIUM 9 3 01/27/2023    AST 34 01/27/2023    ALT 37 01/27/2023    ALKPHOS 78 01/27/2023    PROT 7 3 05/07/2015    TP 7 6 01/27/2023    BILITOT 0 5 05/07/2015    TBILI 0 73 01/27/2023    EGFR 34 01/27/2023     Lab Results   Component Value Date    WBC 6 85 01/27/2023    HGB 13 8 01/27/2023    HCT 41 7 01/27/2023     (H) 01/27/2023     (L) 01/27/2023     Lab Results   Component Value Date    CHOLESTEROL 181 04/16/2021    CHOLESTEROL 170 10/20/2020    CHOLESTEROL 199 04/20/2020     Lab Results Component Value Date    HDL 41 04/16/2021    HDL 43 10/20/2020    HDL 36 (L) 04/20/2020     Lab Results   Component Value Date    LDLCALC 87 04/16/2021    LDLCALC 88 10/20/2020    [facility]  04/20/2020      Comment:      Calculated LDL invalid, triglycerides >400 mg/dl  This screening LDL is a calculated result  It does not have the accuracy of the Direct Measured LDL in the monitoring of patients with hyperlipidemia and/or statin therapy  Direct Measure LDL (WWH796) must be ordered separately in these patients  Lab Results   Component Value Date    TRIG 264 (H) 04/16/2021    TRIG 196 (H) 10/20/2020    TRIG 408 (H) 04/20/2020     No results found for: [place], [place]  Lab Results   Component Value Date    [id number] 2 380 04/16/2021     Lab Results   Component Value Date    PTH 49 2 01/27/2023    CALCIUM 9 3 01/27/2023    PHOS 3 2 01/27/2023     No results found for: SPEP, UPEP  No results found for: AVRIL [id number]        Objective:      /88   Pulse 74   Ht 5' 4" (1 626 m)   Wt 96 1 kg (211 lb 12 8 oz)   SpO2 99%   BMI 36 36 kg/m²          Physical Exam  Vitals reviewed  Constitutional:       General: She is not in acute distress  Appearance: She is well-developed  HENT:      Head: Normocephalic and atraumatic  Eyes:      Conjunctiva/sclera: Conjunctivae normal    Cardiovascular:      Rate and Rhythm: Normal rate and regular rhythm  Pulmonary:      Effort: Pulmonary effort is normal       Breath sounds: Normal breath sounds  Abdominal:      Palpations: Abdomen is soft  Musculoskeletal:      Cervical back: Neck supple  Right lower leg: Edema present  Left lower leg: Edema present  Skin:     General: Skin is warm  Findings: No rash  Neurological:      Mental Status: She is alert and oriented to person, place, and time  Cranial Nerves: No cranial nerve deficit     Psychiatric:         Behavior: Behavior normal

## 2023-06-14 LAB — HBA1C MFR BLD HPLC: 7.3 %

## 2023-08-07 NOTE — PROGRESS NOTES
PT Evaluation     Today's date: 8/10/2023  Patient name: Russell Molina  : 1950  MRN: [id number]  Referring provider: Raad Galloway DO  Dx:   Encounter Diagnosis     ICD-10-CM    1. Back pain with radiation  M54.9                      Assessment  Assessment details:   CURRENT FUNCTIONAL STATUS    Standing/ADL tolerance 10-15 minutes. Walking tolerance 1 block. Moderate difficulty ascending stairs reciprocally. SHORT TERM GOALS (2 WEEKS)    Increase lower extremity strength 3-5 lbs in all weak areas. Decrease pain to 0-4/10. Independent with HEP. Standing/ADL tolerance 20-25 minutes. Walking tolerance 2 blocks. Mild difficulty ascending stairs reciprocally. LONG TERM GOALS (DISCHARGE)    Lumbar Spine AROM: WFL in all planes. Hip AROM: F= deg, ABD= deg, EXT= deg. Hip Strength: F=49 lbs. Knee Strength: E=65 lbs. Decrease pain to 0-2/10  Independent with HEP. Standing/ADL tolerance 20-25 minutes. Walking tolerance 3 blocks. Minimal/no difficulty ascending stairs reciprocally. Understanding of Dx/Px/POC: good   Prognosis: fair    Goals  See assessment details above. Plan  Plan details: Russell Molina is a 75 y.o. female presenting to PT with pain, decreased strength, and decreased tolerance to activity. This patient would benefit from skilled PT services to address these issues and to maximize function. A home exercise program was provided and all questions were answered. She wishes to be seen once a week to monitor her HEP.  Thank you for the referral.    Patient would benefit from: skilled physical therapy  Planned modality interventions: cryotherapy and thermotherapy: hydrocollator packs  Planned therapy interventions: manual therapy, neuromuscular re-education, therapeutic exercise, self care, therapeutic activities and home exercise program  Frequency: 1x week  Duration in weeks: 4        Subjective Evaluation    History of Present Illness  Mechanism of injury: CC: Right sided buttock pain radiating into the front of the right thigh, weakness bending the right hip. Denies having any parasthesias related to her low back. HPI: The patient's back problem began several months ago for no reason. An MRI revealed multi level disc degeneration, with protrusions compressing the right L2 and L3 nerve roots. She has a PMH of a right TKR which does give her some pain. She has weakness going up stairs, and pain with prolonged standing or walking. She has not had any treatment for this condition. She is retired and lives in a 2 story home with her . Patient Goals  Patient goals for therapy: decreased pain and increased strength  Patient goal: Improved ability to walk up stairs and on levels  Pain  Current pain ratin  At best pain ratin  At worst pain ratin          Objective     Postural Observations    Additional Postural Observation Details  Gait and transfers normal.    General Comments:      Lumbar Comments  CURRENT OBJECTIVE MEASUREMENTS    Lumbar Spine AROM: WFL in all planes. Hip AROM: WNL. Hip Strength: F=41 lbs. Knee Strength: E=48 lbs.     Right Knee Reflex: Absent                        Precautions: None      Manuals 8/10                 Neuro Re-Ed         Quadruped Arm Raises         Quadruped Leg Raises         Quadruped Arm and Leg Raises         AB Supine         AB with Arm Raises Supine         AB with Leg Raises Supine         AB with Arm and Leg Raises Supine         Prone TB ROT         Prone Ball Squeeze         Prone Hip EXT         Prone Planks         Squats with Arm Raises         Spinal DDD Education RK                 Ther Ex         Press Ups 10x QID HEP        Wall Slides 10x QID HEP        Standing Back Bends         Side [place]         LTR         SKTC         DKTC         Flexion in Sitting         Flexion in Standing         Curl Ups Straight         Curl Ups Oblique         BACK EXT:  F , P , C         PYR AB:  S , P Rotary Torso:  S , P , C         Hoist Row: S         LAT Pulldown         SA Pulldown         Oblique Press         Multi Hip: Flex  F              EXT  P              ABD                  ADD         PYR QUAD:  S , P , C         NuStep:   S , A                  Ther Activity         Squatting         Lifting         Carrying         Lunging                  Modalities         HP         Traction

## 2023-08-10 ENCOUNTER — EVALUATION (OUTPATIENT)
Dept: PHYSICAL THERAPY | Facility: CLINIC | Age: 73
End: 2023-08-10
Payer: MEDICARE

## 2023-08-10 DIAGNOSIS — M54.9 BACK PAIN WITH RADIATION: Primary | ICD-10-CM

## 2023-08-10 PROCEDURE — 97162 PT EVAL MOD COMPLEX 30 MIN: CPT | Performed by: PHYSICAL THERAPIST

## 2023-08-10 PROCEDURE — 97112 NEUROMUSCULAR REEDUCATION: CPT | Performed by: PHYSICAL THERAPIST

## 2023-08-10 PROCEDURE — 97535 SELF CARE MNGMENT TRAINING: CPT | Performed by: PHYSICAL THERAPIST

## 2023-08-10 NOTE — LETTER
2023    [address]    Patient: Angel Estrada   YOB: 1950   Date of Visit: 8/10/2023     Encounter Diagnosis     ICD-10-CM    1. Back pain with radiation  M54.9           Dear Dr. Zohreh Kamara:    Thank you for your recent referral of Angel Estrada. Please review the attached evaluation summary from Sonya's recent visit. Please verify that you agree with the plan of care by signing the attached order. If you have any questions or concerns, please do not hesitate to call. I sincerely appreciate the opportunity to share in the care of one of your patients and hope to have another opportunity to work with you in the near future. Sincerely,    Alexia Nava, PT      Referring Provider:      I certify that I have read the below Plan of Care and certify the need for these services furnished under this plan of treatment while under my care. [address] [address]  Via Fax: [phone]          PT Evaluation     Today's date: 8/10/2023  Patient name: Angel Estrada  : 1950  MRN: [id number]  Referring provider: Elida Patel DO  Dx:   Encounter Diagnosis     ICD-10-CM    1. Back pain with radiation  M54.9                      Assessment  Assessment details:   CURRENT FUNCTIONAL STATUS    Standing/ADL tolerance 10-15 minutes. Walking tolerance 1 block. Moderate difficulty ascending stairs reciprocally. SHORT TERM GOALS (2 WEEKS)    Increase lower extremity strength 3-5 lbs in all weak areas. Decrease pain to 0-4/10. Independent with HEP. Standing/ADL tolerance 20-25 minutes. Walking tolerance 2 blocks. Mild difficulty ascending stairs reciprocally. LONG TERM GOALS (DISCHARGE)    Lumbar Spine AROM: WFL in all planes. Hip AROM: F= deg, ABD= deg, EXT= deg. Hip Strength: F=49 lbs. Knee Strength: E=65 lbs.    Decrease pain to 0-2/10  Independent with HEP. Standing/ADL tolerance 20-25 minutes. Walking tolerance 3 blocks. Minimal/no difficulty ascending stairs reciprocally. Understanding of Dx/Px/POC: good   Prognosis: fair    Goals  See assessment details above. Plan  Plan details: Harriet Abbott is a 75 y.o. female presenting to PT with pain, decreased strength, and decreased tolerance to activity. This patient would benefit from skilled PT services to address these issues and to maximize function. A home exercise program was provided and all questions were answered. She wishes to be seen once a week to monitor her HEP. Thank you for the referral.    Patient would benefit from: skilled physical therapy  Planned modality interventions: cryotherapy and thermotherapy: hydrocollator packs  Planned therapy interventions: manual therapy, neuromuscular re-education, therapeutic exercise, self care, therapeutic activities and home exercise program  Frequency: 1x week  Duration in weeks: 4        Subjective Evaluation    History of Present Illness  Mechanism of injury: CC: Right sided buttock pain radiating into the front of the right thigh, weakness bending the right hip. Denies having any parasthesias related to her low back. HPI: The patient's back problem began several months ago for no reason. An MRI revealed multi level disc degeneration, with protrusions compressing the right L2 and L3 nerve roots. She has a PMH of a right TKR which does give her some pain. She has weakness going up stairs, and pain with prolonged standing or walking. She has not had any treatment for this condition. She is retired and lives in a 2 story home with her .   Patient Goals  Patient goals for therapy: decreased pain and increased strength  Patient goal: Improved ability to walk up stairs and on levels  Pain  Current pain ratin  At best pain ratin  At worst pain ratin          Objective     Postural Observations    Additional Postural Observation Details  Gait and transfers normal.    General Comments:      Lumbar Comments  CURRENT OBJECTIVE MEASUREMENTS    Lumbar Spine AROM: WFL in all planes. Hip AROM: WNL. Hip Strength: F=41 lbs. Knee Strength: E=48 lbs.     Right Knee Reflex: Absent                      Precautions: None      Manuals 8/10                 Neuro Re-Ed         Quadruped Arm Raises         Quadruped Leg Raises         Quadruped Arm and Leg Raises         AB Supine         AB with Arm Raises Supine         AB with Leg Raises Supine         AB with Arm and Leg Raises Supine         Prone TB ROT         Prone Ball Squeeze         Prone Hip EXT         Prone Planks         Squats with Arm Raises         Spinal DDD Education RK                 Ther Ex         Press Ups 10x QID HEP        Wall Slides 10x QID HEP        Standing Back Bends         Side [place]         LTR         SKTC         DKTC         Flexion in Sitting         Flexion in Standing         Curl Ups Straight         Curl Ups Oblique         BACK EXT:  F , P , C         PYR AB:  S , P         Rotary Torso:  S , P , C         Hoist Row: S         LAT Pulldown         SA Pulldown         Oblique Press         Multi Hip: Flex  F              EXT  P              ABD                  ADD         PYR QUAD:  S , P , C         NuStep:   S , A                  Ther Activity         Squatting         Lifting         Carrying         Lunging                  Modalities         HP         Traction

## 2023-08-17 ENCOUNTER — OFFICE VISIT (OUTPATIENT)
Dept: PHYSICAL THERAPY | Facility: CLINIC | Age: 73
End: 2023-08-17
Payer: MEDICARE

## 2023-08-17 DIAGNOSIS — M54.9 BACK PAIN WITH RADIATION: Primary | ICD-10-CM

## 2023-08-17 PROCEDURE — 97140 MANUAL THERAPY 1/> REGIONS: CPT | Performed by: PHYSICAL THERAPIST

## 2023-08-17 PROCEDURE — 97535 SELF CARE MNGMENT TRAINING: CPT | Performed by: PHYSICAL THERAPIST

## 2023-08-17 NOTE — PROGRESS NOTES
Daily Note     Today's date: 2023  Patient name: Azeem Hendrix  : 1950  MRN: [id number]  Referring provider: Briseyda Castañeda DO  Dx:   Encounter Diagnosis     ICD-10-CM    1. Back pain with radiation  M54.9                      Subjective: Patient states that she tried shopping in a mall recently, but she couldn't walk far due to low back pain. She either had to sit down or bend forward to relieve it. She feels that her press up exercise is aggravating her low back discomfort. Objective: Lumbar flexion and extension are painful across the low back at end range. Bilateral SB is painfree. Mild right piriformis and hamstring tightness is present. Assessment: Tolerated treatment well. Patient had good abdominal control during pelvic tilt exercises. Her back pain at end range flexion and extension was imrpved after treatment. Patient exhibited good technique with therapeutic exercises      Plan: Progress treatment as tolerated.          Precautions: None      Manuals 8/10 8/17       Right piriformis and hamstring streching  RK       Neuro Re-Ed         Quadruped Arm Raises         Quadruped Leg Raises         Quadruped Arm and Leg Raises         AB Supine  20x5" TIW HEP       AB with SLR  20x5" TIW HEP       AB with Leg Raises Supine         AB with Arm and Leg Raises Supine         Prone TB ROT         Prone Ball Squeeze         Prone Hip EXT         Prone Planks         Squats with Arm Raises         Spinal DDD Education RK                 Ther Ex         Press Ups 10x QID HEP DC       Wall Slides 10x QID HEP 10-20x TIW HEP       6" Lateral step ups  10-20x TIW HEP       Side [place]         LTR         SKTC         DKTC         Lumbar Flexion in Sitting  10x5" BID HEP       Lumbar Flexion in Standing  10x5" BID HEP       Curl Ups Straight         Curl Ups Oblique         BACK EXT:  F , P , C         PYR AB:  S , P         Rotary Torso:  S , P , C         Hoist Row: S         LAT Pulldown SA Pulldown         Oblique Press         Multi Hip: Flex  F              EXT  P              ABD                  ADD         PYR QUAD:  S , P , C         NuStep:   S , A                  Ther Activity         Squatting         Lifting         Carrying         Lunging                  Modalities         HP         Traction

## 2023-08-24 ENCOUNTER — OFFICE VISIT (OUTPATIENT)
Dept: PHYSICAL THERAPY | Facility: CLINIC | Age: 73
End: 2023-08-24
Payer: MEDICARE

## 2023-08-24 DIAGNOSIS — M54.9 BACK PAIN WITH RADIATION: Primary | ICD-10-CM

## 2023-08-24 PROCEDURE — 97110 THERAPEUTIC EXERCISES: CPT | Performed by: PHYSICAL THERAPIST

## 2023-08-24 PROCEDURE — 97140 MANUAL THERAPY 1/> REGIONS: CPT | Performed by: PHYSICAL THERAPIST

## 2023-08-24 PROCEDURE — 97535 SELF CARE MNGMENT TRAINING: CPT | Performed by: PHYSICAL THERAPIST

## 2023-08-24 NOTE — PROGRESS NOTES
Daily Note     Today's date: 2023  Patient name: Clay Grewal  : 1950  MRN: [id number]  Referring provider: Arianna Wilson DO  Dx:   Encounter Diagnosis     ICD-10-CM    1. Back pain with radiation  M54.9                      Subjective: Patient states that her weakness and pain are essentially unchanged since starting therapy. Objective: See treatment diary below      Assessment: Tolerated treatment fair, with her right quad fatiguing quickly during bike exercise. She did well with stabilization exercises progressions. Patient would benefit from continued PT      Plan: Progress treatment as tolerated.        Precautions: None      Manuals 8/10 8/17 8/24      Right piriformis and hamstring streching  RK RK      Neuro Re-Ed         Quadruped Arm Raises         Quadruped Leg Raises         Quadruped Arm and Leg Raises         AB Supine  20x5" TIW HEP 20x5" TIW HEP      AB with SLR  20x5" TIW HEP 20x5" TIW HEP"      AB with marching   20x5" TIW HEP      AB with Arm and Leg Raises Supine         Prone TB ROT         Prone Ball Squeeze         Prone Hip EXT         Prone Planks         Squats with Arm Raises         Spinal DDD Education RK                 Ther Ex         Press Ups 10x QID HEP DC       Wall Slides 10x QID HEP 10-20x TIW HEP 20x      6" Lateral step ups  10-20x TIW HEP 20x      Side Glide         LTR         SKTC         DKTC         Lumbar Flexion in Sitting  10x5" BID HEP 10x5" BID HEP      Lumbar Flexion in Standing  10x5" BID HEP 10x5" BID HEP      Curl Ups Straight         Curl Ups Oblique         BACK EXT:  F , P , C         PYR AB:  S , P         Rotary Torso:  S , P , C         Hoist Row: S         LAT Pulldown         SA Pulldown         Oblique Press         TB TKE   L5 20x  TIW HEP      PYR QUAD:  S , P , C         Bike for strength:  S7   L5   3 min               Ther Activity         Squatting         Lifting         Carrying         Lunging                  Modalities HP         Traction

## 2023-08-31 ENCOUNTER — OFFICE VISIT (OUTPATIENT)
Dept: PHYSICAL THERAPY | Facility: CLINIC | Age: 73
End: 2023-08-31
Payer: MEDICARE

## 2023-08-31 DIAGNOSIS — M54.9 BACK PAIN WITH RADIATION: Primary | ICD-10-CM

## 2023-08-31 PROCEDURE — 97140 MANUAL THERAPY 1/> REGIONS: CPT | Performed by: PHYSICAL THERAPIST

## 2023-08-31 PROCEDURE — 97112 NEUROMUSCULAR REEDUCATION: CPT | Performed by: PHYSICAL THERAPIST

## 2023-08-31 PROCEDURE — 97110 THERAPEUTIC EXERCISES: CPT | Performed by: PHYSICAL THERAPIST

## 2023-08-31 NOTE — PROGRESS NOTES
Daily Note     Today's date: 2023  Patient name: Talia García  : 1950  MRN: [id number]  Referring provider: Yumiko Wright DO  Dx:   Encounter Diagnosis     ICD-10-CM    1. Back pain with radiation  M54.9                      Subjective: Patient still has to bend forward after prolonged standing and walking in order to relive her low back discomfort. She feels that the marching exercise may be aggravating her low back pain, causing some soreness for about 30 minutes afterward. Her right thigh does get tired during step and squat exercises. Objective: See treatment diary below      Assessment: Patient did well with clinic core strengthening exercises. She had no back pain with 5 reps of marching exercise. Plan: Progress treatment as tolerated.        Precautions: None      Manuals 8/10 8/17 8/24 8/31     Right piriformis and hamstring streching  RK RK RK     Neuro Re-Ed         Quadruped Arm Raises         Quadruped Leg Raises         Quadruped Arm and Leg Raises         AB Supine  20x5" TIW HEP 20x5" TIW HEP 20x5"     AB with SLR  20x5" TIW HEP 20x5" TIW HEP" 20x5"     AB with marching   20x5" TIW HEP 5x5"     AB with Arm and Leg Raises Supine         Prone TB ROT         Prone Ball Squeeze         Prone Hip EXT         Prone Planks         Squats with Arm Raises         Spinal DDD Education RK                 Ther Ex         Press Ups 10x QID HEP DC       Wall Slides 10x QID HEP 10-20x TIW HEP 20x 20x     6" Lateral step ups  10-20x TIW HEP 20x 20x     Side Glide         LTR         SKTC         DKTC         Lumbar Flexion in Sitting  10x5" BID HEP 10x5" BID HEP      Lumbar Flexion in Standing  10x5" BID HEP 10x5" BID HEP      Curl Ups Straight         Curl Ups Oblique         BACK EXT:  F 3, P 4, C 0    P12 2/10     PYR AB:  S 7, P 1    P9  2/10     Rotary Torso:  S , P , C         Hoist Row: S         LAT Pulldown         SA Pulldown    P5 2/10     Oblique Press         TB TKE   L5 20x  TIW HEP L5 20x     PYR QUAD:  S , P , C         Bike for strength:  S7   L5   3 min L5 10 min              Ther Activity         Squatting         Lifting         Carrying         Lunging                  Modalities         HP         Traction

## 2023-09-05 ENCOUNTER — OFFICE VISIT (OUTPATIENT)
Dept: PHYSICAL THERAPY | Facility: CLINIC | Age: 73
End: 2023-09-05
Payer: MEDICARE

## 2023-09-05 DIAGNOSIS — M54.9 BACK PAIN WITH RADIATION: Primary | ICD-10-CM

## 2023-09-05 PROCEDURE — 97110 THERAPEUTIC EXERCISES: CPT | Performed by: PHYSICAL THERAPIST

## 2023-09-05 PROCEDURE — 97112 NEUROMUSCULAR REEDUCATION: CPT | Performed by: PHYSICAL THERAPIST

## 2023-09-05 PROCEDURE — 97140 MANUAL THERAPY 1/> REGIONS: CPT | Performed by: PHYSICAL THERAPIST

## 2023-09-05 NOTE — PROGRESS NOTES
Daily Note     Today's date: 2023  Patient name: Sigifredo Ruiz  : 1950  MRN: [id number]  Referring provider: David Lanier DO  Dx:   Encounter Diagnosis     ICD-10-CM    1. Back pain with radiation  M54.9                      Subjective: Patient was able to walk more this past weekend, with some rest times. Prolonged standing produced soreness across the low back. Her right leg is stronger when walking up stairs. Objective: See treatment diary below      Assessment: Tolerated treatment well. Patient exhibited good technique with therapeutic exercise progressions. Plan: Progress treatment as tolerated.        Precautions: None      Manuals 8/10 8/17 8/24 8/31 9/5    Right piriformis and hamstring streching  RK RK RK hamstring only-  RK    Neuro Re-Ed         Quadruped Arm Raises         Quadruped Leg Raises         Quadruped Arm and Leg Raises         AB Supine  20x5" TIW HEP 20x5" TIW HEP 20x5" 10x5"    AB with SLR  20x5" TIW HEP 20x5" TIW HEP" 20x5" 10x5"    AB with marching   20x5" TIW HEP 5x5" 10x5"    AB with Arm and Leg Raises Supine         Prone TB ROT         Prone Ball Squeeze         Prone Hip EXT         Prone Planks         Squats with Arm Raises         Spinal DDD Education RK                 Ther Ex         Press Ups 10x QID HEP DC       Wall Slides 10x QID HEP 10-20x TIW HEP 20x 20x 20x    6" Lateral step ups  10-20x TIW HEP 20x 20x 20x    Side Glide         LTR         SKTC         DKTC         Lumbar Flexion in Sitting  10x5" BID HEP 10x5" BID HEP      Lumbar Flexion in Standing  10x5" BID HEP 10x5" BID HEP      Curl Ups Straight         Curl Ups Oblique         BACK EXT:  F 3, P 4, C 0    P12 2/10 P12 2/10    PYR AB:  S 7, P 1    P9  2/10 P9 2/10    Rotary Torso:  S 1, P 3, C 5     P3 2/10    Hoist Row: S         LAT Pulldown         SA Pulldown    P5 2/10 P5 2/10    Oblique Press     P1 2/10 ea    TB TKE   L5 20x  TIW HEP  P3 3/0    PYR QUAD:  S , P , C         Bike for strength:  S7   L5   3 min L5 10 min              Ther Activity         Lunging                  Modalities         HP         Traction

## 2023-09-07 ENCOUNTER — APPOINTMENT (OUTPATIENT)
Dept: PHYSICAL THERAPY | Facility: CLINIC | Age: 73
End: 2023-09-07
Payer: MEDICARE

## 2023-09-11 ENCOUNTER — TELEPHONE (OUTPATIENT)
Dept: NEPHROLOGY | Facility: CLINIC | Age: 73
End: 2023-09-11

## 2023-09-14 ENCOUNTER — EVALUATION (OUTPATIENT)
Dept: PHYSICAL THERAPY | Facility: CLINIC | Age: 73
End: 2023-09-14
Payer: MEDICARE

## 2023-09-14 DIAGNOSIS — M54.9 BACK PAIN WITH RADIATION: Primary | ICD-10-CM

## 2023-09-14 PROCEDURE — 97110 THERAPEUTIC EXERCISES: CPT | Performed by: PHYSICAL THERAPIST

## 2023-09-14 PROCEDURE — 97112 NEUROMUSCULAR REEDUCATION: CPT | Performed by: PHYSICAL THERAPIST

## 2023-09-14 PROCEDURE — 97140 MANUAL THERAPY 1/> REGIONS: CPT | Performed by: PHYSICAL THERAPIST

## 2023-09-14 NOTE — LETTER
September 15, 2023    [address]    Patient: Seymour Mota   YOB: 1950   Date of Visit: 2023     Encounter Diagnosis     ICD-10-CM    1. Back pain with radiation  M54.9           Dear Dr. David Snider:    Thank you for your recent referral of Seymour Mota. Please review the attached evaluation summary from Sonya's recent visit. Please verify that you agree with the plan of care by signing the attached order. If you have any questions or concerns, please do not hesitate to call. I sincerely appreciate the opportunity to share in the care of one of your patients and hope to have another opportunity to work with you in the near future. Sincerely,    Kvng Beckford, PT      Referring Provider:      I certify that I have read the below Plan of Care and certify the need for these services furnished under this plan of treatment while under my care. [address] [address]  Via Fax: [phone]          PT Re-Evaluation     Today's date: 2023  Patient name: Seymour Mota  : 1950  MRN: [id number]  Referring provider: Alejandra Rob DO  Dx:   Encounter Diagnosis     ICD-10-CM    1. Back pain with radiation  M54.9                      Assessment  Assessment details:   CURRENT FUNCTIONAL STATUS    Standing/ADL tolerance 10-15 minutes. Walking tolerance 1 block. Mild difficulty ascending stairs reciprocally. SHORT TERM GOALS (2 WEEKS)    Increase lower extremity strength 3-5 lbs in all weak areas. Decrease pain to 0-4/10. Independent with HEP. Standing/ADL tolerance 20-25 minutes. Walking tolerance 2 blocks. Mild difficulty ascending stairs reciprocally. LONG TERM GOALS (DISCHARGE)    Lumbar Spine AROM: WFL in all planes. -met  Hip AROM: WNL. -met  Hip Strength: F=49 lbs. -not met   Knee Strength: E=65 lbs.-partially met   Decrease pain to 0-2/10-not met  Independent with HEP. -met  Standing/ADL tolerance 20-25 minutes. -not met    Walking tolerance 3 blocks.-not met      Minimal/no difficulty ascending stairs reciprocally. -partially met     Understanding of Dx/Px/POC: good   Prognosis: fair    Goals  See assessment details above. Plan  Plan details: The patient has shown limited improvement in PT. She continues to present with pain, decreased strength, and decreased tolerance to activity. The patient would benefit from continued skilled PT services to address these issues and to maximize function. The patient will also continue performing their HEP. Patient would benefit from: skilled physical therapy  Planned modality interventions: cryotherapy and thermotherapy: hydrocollator packs  Planned therapy interventions: manual therapy, neuromuscular re-education, therapeutic exercise, self care, therapeutic activities and home exercise program  Frequency: 1x week  Duration in weeks: 4        Subjective Evaluation    History of Present Illness  Mechanism of injury: Subjective: The patient's back pain continues at moderate levels when standing or walking. It is relieved by bending forward. She does have improved strength when climbing up stairs. Patient Goals  Patient goals for therapy: decreased pain and increased strength  Patient goal: Improved ability to walk up stairs and on levels  Pain  Current pain ratin  At best pain ratin  At worst pain ratin          Objective     Postural Observations    Additional Postural Observation Details  Gait and transfers normal.    General Comments:      Lumbar Comments  CURRENT OBJECTIVE MEASUREMENTS    Lumbar Spine AROM: WFL in all planes. Hip AROM: WNL. Hip Strength: F=41 lbs. Knee Strength: E=52 lbs.     Right Knee Reflex: Absent                      Precautions: None        Manuals 8/10 8/17 8/24 8/31 9   Right piriformis and hamstring streching   RK ANTOINE SCHULTZ hamstring only-  ANTOINE  hamstring-RK   Neuro Re-Ed               Quadruped Arm Raises               Quadruped Leg Raises               Quadruped Arm and Leg Raises               AB Supine   20x5" TIW HEP 20x5" TIW HEP 20x5" 10x5"  10x5"   AB with SLR   20x5" TIW HEP 20x5" TIW HEP" 20x5" 10x5"  10x5"   AB with marching     20x5" TIW HEP 5x5" 10x5"  10x5"   AB with Arm and Leg Raises Supine               Prone TB ROT               Prone Ball Squeeze               Prone Hip EXT               Prone Planks               Squats with Arm Raises               Spinal DDD Education RK                             Ther Ex               Press Ups 10x QID HEP DC           Wall Slides 10x QID HEP 10-20x TIW HEP 20x 20x 20x  20x   6" Lateral step ups   10-20x TIW HEP 20x 20x 20x  20x   Side Glide               LTR               SKTC               DKTC               Lumbar Flexion in Sitting   10x5" BID HEP 10x5" BID HEP         Lumbar Flexion in Standing   10x5" BID HEP 10x5" BID HEP         Curl Ups Straight               Curl Ups Oblique               BACK EXT:  F 3, P 4, C 0       P12 2/10 P12 2/10  P12 2/10   PYR AB:  S 7, P 1       P9  2/10 P9 2/10  P9 2/10   Rotary Torso:  S 1, P 3, C 5         P3 2/10  P3 2/10   Hoist Row: S               LAT Pulldown               SA Pulldown       P5 2/10 P5 2/10  P5 2/10   Oblique Press         P1 2/10 ea  P1 2/10 ea   TB TKE     L5 20x  TIW HEP   P3 3/0  P3 3/10   PYR QUAD:  S , P , C               Bike for strength:  S7     L5   3 min L5 10 min    L5 10 min                   Ther Activity               Lunging                               Modalities               HP               Traction

## 2023-09-21 ENCOUNTER — OFFICE VISIT (OUTPATIENT)
Dept: PHYSICAL THERAPY | Facility: CLINIC | Age: 73
End: 2023-09-21
Payer: MEDICARE

## 2023-09-21 DIAGNOSIS — M54.9 BACK PAIN WITH RADIATION: Primary | ICD-10-CM

## 2023-09-21 PROCEDURE — 97110 THERAPEUTIC EXERCISES: CPT | Performed by: PHYSICAL THERAPIST

## 2023-09-21 PROCEDURE — 97140 MANUAL THERAPY 1/> REGIONS: CPT | Performed by: PHYSICAL THERAPIST

## 2023-09-21 NOTE — PROGRESS NOTES
Daily Note     Today's date: 2023  Patient name: Elie Farley  : 1950  MRN: [id number]  Referring provider: Sulema Haney DO  Dx:   Encounter Diagnosis     ICD-10-CM    1. Back pain with radiation  M54.9                      Subjective: Patient notes improved right leg strength, but standing and walking create low back soreness. It is relieved by bending forward. Objective: See treatment diary below      Assessment: Tolerated treatment well. Patient exhibited good technique with therapeutic exercises and would benefit from continued PT      Plan: Potential discharge next visit.        Precautions: None        Manuals    Right piriformis and hamstring streching  Hamstring-  RK RK RK RK hamstring only-  RK  hamstring-RK   Neuro Re-Ed               Quadruped Arm Raises               Quadruped Leg Raises               Quadruped Arm and Leg Raises               AB Supine   20x5" TIW HEP 20x5" TIW HEP 20x5" 10x5"  10x5"   AB with SLR   20x5" TIW HEP 20x5" TIW HEP" 20x5" 10x5"  10x5"   AB with marching     20x5" TIW HEP 5x5" 10x5"  10x5"   AB with Arm and Leg Raises Supine               Prone TB ROT               Prone Ball Squeeze               Prone Hip EXT               Prone Planks               Squats with Arm Raises               Spinal DDD Education                              Ther Ex               Press Ups  DC           Wall Slides 30x 10-20x TIW HEP 20x 20x 20x  20x   6" Lateral step ups  30x 10-20x TIW HEP 20x 20x 20x  20x   Side Glide               LTR               SKTC               DKTC               Lumbar Flexion in Sitting   10x5" BID HEP 10x5" BID HEP         Lumbar Flexion in Standing   10x5" BID HEP 10x5" BID HEP         Curl Ups Straight               Curl Ups Oblique               BACK EXT:  F 3, P 4, C 0  P12 3/10     P12 2/10 P12 2/10  P12 2/10   PYR AB:  S 7, P 1  P9 3/10     P9  2/10 P9 2/10  P9 2/10   Rotary Torso:  S 1, P 3, C 5  P3 3/10       P3 2/10  P3 2/10   Hoist Row: S               LAT Pulldown               SA Pulldown  P5 3/10     P5 2/10 P5 2/10  P5 2/10   Oblique Press  P1 3/10 ea       P1 2/10 ea  P1 2/10 ea   TB TKE P3 3/10   L5 20x  TIW HEP   P3 3/0  P3 3/10   PYR QUAD:  S , P , C               Bike for strength:  S7  L5 10 min   L5   3 min L5 10 min    L5 10 min                   Ther Activity               Lunging                               Modalities               HP               Traction

## 2023-09-25 ENCOUNTER — APPOINTMENT (OUTPATIENT)
Dept: LAB | Facility: MEDICAL CENTER | Age: 73
End: 2023-09-25
Payer: MEDICARE

## 2023-09-25 DIAGNOSIS — N18.32 STAGE 3B CHRONIC KIDNEY DISEASE (HCC): ICD-10-CM

## 2023-09-25 LAB
25(OH)D3 SERPL-MCNC: 60.8 NG/ML (ref 30–100)
ALBUMIN SERPL BCP-MCNC: 4.1 G/DL (ref 3.5–5)
ALP SERPL-CCNC: 67 U/L (ref 34–104)
ALT SERPL W P-5'-P-CCNC: 30 U/L (ref 7–52)
ANION GAP SERPL CALCULATED.3IONS-SCNC: 9 MMOL/L
AST SERPL W P-5'-P-CCNC: 32 U/L (ref 13–39)
BACTERIA UR QL AUTO: ABNORMAL /HPF
BILIRUB SERPL-MCNC: 0.74 MG/DL (ref 0.2–1)
BILIRUB UR QL STRIP: NEGATIVE
BUN SERPL-MCNC: 25 MG/DL (ref 5–25)
CALCIUM SERPL-MCNC: 9.1 MG/DL (ref 8.4–10.2)
CHLORIDE SERPL-SCNC: 101 MMOL/L (ref 96–108)
CLARITY UR: CLEAR
CO2 SERPL-SCNC: 28 MMOL/L (ref 21–32)
COLOR UR: YELLOW
CREAT SERPL-MCNC: 1.27 MG/DL (ref 0.6–1.3)
CREAT UR-MCNC: 106.3 MG/DL
GFR SERPL CREATININE-BSD FRML MDRD: 41 ML/MIN/1.73SQ M
GLUCOSE P FAST SERPL-MCNC: 121 MG/DL (ref 65–99)
GLUCOSE UR STRIP-MCNC: NEGATIVE MG/DL
HGB UR QL STRIP.AUTO: NEGATIVE
KETONES UR STRIP-MCNC: NEGATIVE MG/DL
LEUKOCYTE ESTERASE UR QL STRIP: ABNORMAL
MICROALBUMIN UR-MCNC: 30.4 MG/L
MICROALBUMIN/CREAT 24H UR: 29 MG/G CREATININE (ref 0–30)
MUCOUS THREADS UR QL AUTO: ABNORMAL
NITRITE UR QL STRIP: NEGATIVE
NON-SQ EPI CELLS URNS QL MICRO: ABNORMAL /HPF
PH UR STRIP.AUTO: 5.5 [PH]
POTASSIUM SERPL-SCNC: 3.9 MMOL/L (ref 3.5–5.3)
PROT SERPL-MCNC: 7.4 G/DL (ref 6.4–8.4)
PROT UR STRIP-MCNC: ABNORMAL MG/DL
PTH-INTACT SERPL-MCNC: 24.4 PG/ML (ref 12–88)
RBC #/AREA URNS AUTO: ABNORMAL /HPF
SODIUM SERPL-SCNC: 138 MMOL/L (ref 135–147)
SP GR UR STRIP.AUTO: 1.01 (ref 1–1.03)
UROBILINOGEN UR STRIP-ACNC: <2 MG/DL
WBC #/AREA URNS AUTO: ABNORMAL /HPF

## 2023-09-25 PROCEDURE — 82570 ASSAY OF URINE CREATININE: CPT

## 2023-09-25 PROCEDURE — 36415 COLL VENOUS BLD VENIPUNCTURE: CPT

## 2023-09-25 PROCEDURE — 81001 URINALYSIS AUTO W/SCOPE: CPT

## 2023-09-25 PROCEDURE — 80053 COMPREHEN METABOLIC PANEL: CPT

## 2023-09-25 PROCEDURE — 82306 VITAMIN D 25 HYDROXY: CPT

## 2023-09-25 PROCEDURE — 82043 UR ALBUMIN QUANTITATIVE: CPT

## 2023-09-25 PROCEDURE — 83970 ASSAY OF PARATHORMONE: CPT

## 2023-09-25 NOTE — PROGRESS NOTES
PT Discharge    Today's date: 2023  Patient name: Krzysztof Elder  : 1950  MRN: [id number]  Referring provider: Pretty Jacobs DO  Dx:   Encounter Diagnosis     ICD-10-CM    1. Back pain with radiation  M54.9                      Assessment  Assessment details:   CURRENT FUNCTIONAL STATUS    Standing/ADL tolerance 10-15 minutes. Walking tolerance 1 block. Mild difficulty ascending stairs reciprocally. LONG TERM GOALS (DISCHARGE)    Lumbar Spine AROM: WFL in all planes. -met  Hip AROM: WNL. -met  Hip Strength: F=49 lbs. -not met   Knee Strength: E=65 lbs.-partially met   Decrease pain to 0-2/10-not met  Independent with HEP. -met  Standing/ADL tolerance 20-25 minutes. -not met    Walking tolerance 3 blocks.-not met      Minimal/no difficulty ascending stairs reciprocally. -partially met     Understanding of Dx/Px/POC: good   Prognosis: fair    Goals  See assessment details above. Plan  Plan details: The patient has shown limited improvement in PT, continuing to exhibit moderate pain levels with prolonged standing and walking. She has been discharged to a [facility] and will follow up with her physician for her ongoing pain and functional limitations. Planned modality interventions: cryotherapy and thermotherapy: hydrocollator packs  Planned therapy interventions: manual therapy, neuromuscular re-education, therapeutic exercise, self care, therapeutic activities and home exercise program        Subjective Evaluation    History of Present Illness  Mechanism of injury: Subjective: The patient's back pain continues at moderate levels when standing or walking, without significant improvement noted since starting therapy. She does have improved strength in her right leg when climbing up stairs.   Patient Goals  Patient goals for therapy: decreased pain and increased strength  Patient goal: Improved ability to walk up stairs and on levels  Pain  Current pain ratin  At best pain ratin  At worst pain ratin          Objective     Postural Observations    Additional Postural Observation Details  Gait and transfers normal.    General Comments:      Lumbar Comments  CURRENT OBJECTIVE MEASUREMENTS    Lumbar Spine AROM: WFL in all planes. Hip AROM: WNL. Hip Strength: F=41 lbs. Knee Strength: E=52 lbs.     Right Knee Reflex: Absent                        Precautions: None        Manuals    Right piriformis and hamstring streching  Hamstring-  RK Hamstring-RK RK RK hamstring only-  RK  hamstring-RK   Neuro Re-Ed               Quadruped Arm Raises               Quadruped Leg Raises               Quadruped Arm and Leg Raises               AB Supine    20x5" TIW HEP 20x5" 10x5"  10x5"   AB with SLR    20x5" TIW HEP" 20x5" 10x5"  10x5"   AB with marching     20x5" TIW HEP 5x5" 10x5"  10x5"   AB with Arm and Leg Raises Supine               Prone TB ROT               Prone Ball Squeeze               Prone Hip EXT               Prone Planks               Squats with Arm Raises               Spinal DDD Education                               Ther Ex               Press Ups              Wall Slides 30x 20x 20x 20x 20x  20x   6" Lateral step ups  30x 20x 20x 20x 20x  20x   Side Glide               LTR               SKTC               DKTC               Lumbar Flexion in Sitting    10x5" BID HEP         Lumbar Flexion in Standing    10x5" BID HEP         Curl Ups Straight               Curl Ups Oblique               BACK EXT:  F 3, P 4, C 0  P12 3/10  P12 2/10   P12 2/10 P12 2/10  P12 2/10   PYR AB:  S 7, P 1  P9 3/10  P9 3/10   P9  2/10 P9 2/10  P9 2/10   Rotary Torso:  S 1, P 3, C 5  P3 3/10  P3 2/10     P3 2/10  P3 2/10   Hoist Row: S               LAT Pulldown               SA Pulldown  P5 3/10  P5 2/10   P5 2/10 P5 2/10  P5 2/10   Oblique Press  P1 3/10 ea  P1 2/10 ea     P1 2/10 ea  P1 2/10 ea   TB TKE P3 3/10  P3 3/10 L5 20x  TIW HEP   P3 3/0  P3 3/10   PYR QUAD:  S , P , C               Bike for strength:  S7  L5 10 min  L5 10 min L5   3 min L5 10 min    L5 10 min                   Ther Activity               Lunging                               Modalities               HP               Traction

## 2023-09-28 ENCOUNTER — OFFICE VISIT (OUTPATIENT)
Dept: PHYSICAL THERAPY | Facility: CLINIC | Age: 73
End: 2023-09-28
Payer: MEDICARE

## 2023-09-28 DIAGNOSIS — M54.9 BACK PAIN WITH RADIATION: Primary | ICD-10-CM

## 2023-09-28 PROCEDURE — 97140 MANUAL THERAPY 1/> REGIONS: CPT | Performed by: PHYSICAL THERAPIST

## 2023-09-28 PROCEDURE — 97110 THERAPEUTIC EXERCISES: CPT | Performed by: PHYSICAL THERAPIST

## 2023-09-29 ENCOUNTER — OFFICE VISIT (OUTPATIENT)
Dept: NEPHROLOGY | Facility: CLINIC | Age: 73
End: 2023-09-29
Payer: MEDICARE

## 2023-09-29 VITALS
SYSTOLIC BLOOD PRESSURE: 124 MMHG | WEIGHT: 217 LBS | HEART RATE: 65 BPM | BODY MASS INDEX: 37.05 KG/M2 | HEIGHT: 64 IN | OXYGEN SATURATION: 98 % | DIASTOLIC BLOOD PRESSURE: 72 MMHG

## 2023-09-29 DIAGNOSIS — R80.1 PERSISTENT PROTEINURIA: ICD-10-CM

## 2023-09-29 DIAGNOSIS — N18.30 BENIGN HYPERTENSION WITH CKD (CHRONIC KIDNEY DISEASE) STAGE III (HCC): ICD-10-CM

## 2023-09-29 DIAGNOSIS — I12.9 BENIGN HYPERTENSION WITH CKD (CHRONIC KIDNEY DISEASE) STAGE III (HCC): ICD-10-CM

## 2023-09-29 DIAGNOSIS — N18.32 STAGE 3B CHRONIC KIDNEY DISEASE (HCC): Primary | ICD-10-CM

## 2023-09-29 DIAGNOSIS — E11.21 DIABETIC NEPHROPATHY ASSOCIATED WITH TYPE 2 DIABETES MELLITUS (HCC): ICD-10-CM

## 2023-09-29 DIAGNOSIS — R60.0 BILATERAL LOWER EXTREMITY EDEMA: ICD-10-CM

## 2023-09-29 PROCEDURE — 99213 OFFICE O/P EST LOW 20 MIN: CPT | Performed by: NURSE PRACTITIONER

## 2023-09-29 RX ORDER — FINERENONE 10 MG/1
20 TABLET, FILM COATED ORAL DAILY
Qty: 90 TABLET | Refills: 3 | Status: SHIPPED | OUTPATIENT
Start: 2023-09-29

## 2023-09-29 NOTE — PROGRESS NOTES
Nephrology   Office [address] [place] [address] Jasbir 75 y.o. female MRN: [id number]    Encounter: [id number]        [address] was seen in the [place] office today. All diagnoses and orders for visit:     1. Stage 3b chronic kidney disease ([address])  · Record review indicates baseline creatinine around 1.3-1.5 mg/dL dating back to 2015. Etiology of chronicity presumed diabetic nephropathy, hypertensive nephrosclerosis, obesity related. Does have history of RUY. No imaging to review. · continue losartan, finerenone, dapagliflozin  · She will continue efforts to lose weight  · She will avoid NSAIDs  · Repeat lab work in 6 months prior to returning with nephrologist  2. Diabetic nephropathy associated with type 2 diabetes mellitus ([address])  · Not biopsy-proven. Improving microalbuminuria noted. On losartan, finerenone and empagliflozin. Previously followed with Serenity Nino   -     Kerendia 10 MG TABS; Take 20 mg by mouth daily   -     Comprehensive metabolic panel; Future; Expected date: 03/04/2024   -     CBC and differential; Future; Expected date: 03/04/2024   -     Albumin / creatinine urine ratio; Future; Expected date: 03/04/2024   -     Phosphorus; Future; Expected date: 03/04/2024   -     Urinalysis with microscopic; Future; Expected date: 03/04/2024  3. Benign hypertension with CKD (chronic kidney disease) stage III ([facility])  · Blood pressure goal less than 130/80 mmHg. Current blood pressure appropriate-no changes indicated  4. Persistent proteinuria  · Likely due to diabetic nephropathy. Being treated as above  5.  Bilateral lower extremity edema  · Patient has trace lower extremity edema on exam.  Continue low-sodium diet        HPI: Dick Rolle is a 75 y.o. female who is here for scheduled follow-up     Pertinent problems include CKD 3, presumed diabetic nephropathy with type 2 diabetes mellitus, hypertension, obesity, degenerative disc disease    Kidney function is stable and within typical baseline. Are well controlled. She will continue to focus on low carbohydrate and low-sodium diet. Juan Luis Master refilled to pharmacy per patient's request        ROS:   Review of Systems   Constitutional: Negative for chills and fever. HENT: Negative for ear pain and sore throat. Eyes: Negative for pain and visual disturbance. Respiratory: Negative for cough and shortness of breath. Cardiovascular: Negative for chest pain and palpitations. Gastrointestinal: Negative for abdominal pain and vomiting. Genitourinary: Negative for dysuria and hematuria. Musculoskeletal: Positive for arthralgias, back pain and gait problem. Skin: Negative for color change and rash. Neurological: Negative for seizures and syncope. All other systems reviewed and are negative. Allergies: Patient has no known allergies.     Medications:   Current Outpatient Medications:   •  Alphagan P 0.1 %, , Disp: , Rfl:   •  aspirin 81 MG tablet, Take by mouth, Disp: , Rfl:   •  B Complex-Folic Acid (SUPER B COMPLEX MAXI) TABS, Take by mouth, Disp: , Rfl:   •  Calcium-Vitamin D 500-125 MG-UNIT TABS, Take 1,000 mg by mouth daily, Disp: , Rfl:   •  Cholecalciferol (VITAMIN D3) 5000 units CAPS, Take by mouth once a week Takes 3 tablets one day a week., Disp: , Rfl:   •  dapagliflozin 5 MG TABS, Take 5 mg by mouth daily, Disp: , Rfl:   •  ezetimibe (ZETIA) 10 mg tablet, Take 10 mg by mouth daily, Disp: , Rfl:   •  glucose blood (FREESTYLE LITE) test strip, 1 each by Other route 4 (four) times a day, Disp: 100 each, Rfl: 5  •  Icosapent Ethyl 1 g CAPS, Take 2 capsules (2 g total) by mouth 2 (two) times a day, Disp: 120 capsule, Rfl: 5  •  insulin lispro (HumaLOG) 100 units/mL injection, Inject under the skin, Disp: , Rfl:   •  Kerendia 10 MG TABS, Take 20 mg by mouth daily, Disp: 90 tablet, Rfl: 3  •  levothyroxine 100 mcg tablet, Take 1 tablet by mouth daily, Disp: , Rfl:   •  losartan (COZAAR) 100 MG tablet, Take 100 mg by mouth daily, Disp: , Rfl:   •  Semaglutide (Rybelsus) 7 MG TABS, Take by mouth daily, Disp: , Rfl:   •  simvastatin (ZOCOR) 80 mg tablet, Take 80 mg by mouth daily at bedtime, Disp: , Rfl:     Past Medical History:   Diagnosis Date   • Benign essential hypertension    • Cataracts, bilateral     s/p bilateratl extraction and lens implant    • Chronic kidney disease, stage 3 (HCC)    • Edema    • Essential hypertension    • Herpes zoster     Left upper chest   • Hyperlipidemia    • Hypothyroidism    • Myocardial infarction (HCC)    • Proteinuria    • Renal failure syndrome    • Type 2 diabetes mellitus (HCC)    • Type II diabetes mellitus, uncontrolled    • Vitamin D deficiency      Past Surgical History:   Procedure Laterality Date   • BREAST SURGERY  1996    Lumpectomy - Benign    • CATARACT EXTRACTION, BILATERAL  12/2015   • HYSTERECTOMY      Total    • JOINT REPLACEMENT Left 2012    Knee    • JOINT REPLACEMENT Right 01/19/2017    TKR     Family History   Problem Relation Age of Onset   • Other Mother         Aortic valve replacement    • Diabetes type II Father       reports that she has never smoked. She has never used smokeless tobacco. She reports that she does not drink alcohol and does not use drugs. Physical Exam:   Vitals:    09/29/23 0944   BP: 124/72   Pulse: 65   SpO2: 98%   Weight: 98.4 kg (217 lb)   Height: 5' 4" (1.626 m)     Body mass index is 37.25 kg/m².     General: conscious, cooperative, in no acute distress, appears stated age  Eyes: conjunctivae pale, anicteric sclerae  ENT: lips and mucous membranes moist  Neck: supple, no JVD, no masses  Chest:  essentially clear breath sounds bilaterally, no crackles, ronchus or wheezings  CVS: S1 & S2, normal rate, regular rhythm  Abdomen: soft, non-tender, non-distended, normoactive bowel sounds, rounded  Extremities: no edema of both legs  Skin: no rash   Neuro: awake, alert, oriented       Diagnostic Data:  Lab: I have personally reviewed pertinent lab results. ,   CBC:       CMP: No results found for: "SODIUM", "K", "CL", "CO2", "ANIONGAP", "BUN", "CREATININE", "GLUCOSE", "CALCIUM", "AST", "ALT", "ALKPHOS", "PROT", "BILITOT", "EGFR",   PT/INR: No results found for: "PT", "INR",   Magnesium: No components found for: "MAG",  Phosphorous: No results found for: "PHOS"    Patient Instructions   It was nice to see you today. Please repeat labs in about 6 months. Keep up the great work! Portions of the record may have been created with voice recognition software. Occasional wrong word or "sound a like" substitutions may have occurred due to the inherent limitations of voice recognition software. Read the chart carefully and recognize, using context, where substitutions have occurred. If you have any questions, please contact the dictating provider.

## 2023-10-31 ENCOUNTER — APPOINTMENT (OUTPATIENT)
Dept: LAB | Facility: MEDICAL CENTER | Age: 73
End: 2023-10-31
Payer: MEDICARE

## 2023-10-31 DIAGNOSIS — E11.9 TYPE 2 DIABETES MELLITUS WITHOUT COMPLICATION, UNSPECIFIED WHETHER LONG TERM INSULIN USE (HCC): ICD-10-CM

## 2023-10-31 LAB
EST. AVERAGE GLUCOSE BLD GHB EST-MCNC: 180 MG/DL
HBA1C MFR BLD: 7.9 %

## 2023-10-31 PROCEDURE — 36415 COLL VENOUS BLD VENIPUNCTURE: CPT

## 2023-10-31 PROCEDURE — 83036 HEMOGLOBIN GLYCOSYLATED A1C: CPT

## 2024-02-19 ENCOUNTER — TELEPHONE (OUTPATIENT)
Dept: NEPHROLOGY | Facility: CLINIC | Age: 74
End: 2024-02-19

## 2024-02-19 DIAGNOSIS — N18.31 STAGE 3A CHRONIC KIDNEY DISEASE (HCC): Primary | ICD-10-CM

## 2024-02-19 NOTE — TELEPHONE ENCOUNTER
Patient requesting new lab orders. Patient aware of labs needing to be done prior to appointment.

## 2024-02-21 ENCOUNTER — APPOINTMENT (OUTPATIENT)
Dept: LAB | Facility: MEDICAL CENTER | Age: 74
End: 2024-02-21
Payer: MEDICARE

## 2024-02-21 DIAGNOSIS — N18.31 STAGE 3A CHRONIC KIDNEY DISEASE (HCC): ICD-10-CM

## 2024-02-21 DIAGNOSIS — Z13.9 SCREENING FOR UNSPECIFIED CONDITION: ICD-10-CM

## 2024-02-21 DIAGNOSIS — E11.21 DIABETIC NEPHROPATHY ASSOCIATED WITH TYPE 2 DIABETES MELLITUS (HCC): ICD-10-CM

## 2024-02-21 DIAGNOSIS — E13.9 DIABETES MELLITUS OF OTHER TYPE WITHOUT COMPLICATION, UNSPECIFIED WHETHER LONG TERM INSULIN USE (HCC): ICD-10-CM

## 2024-02-21 PROCEDURE — 83036 HEMOGLOBIN GLYCOSYLATED A1C: CPT

## 2024-02-21 PROCEDURE — 36415 COLL VENOUS BLD VENIPUNCTURE: CPT

## 2024-02-22 LAB
EST. AVERAGE GLUCOSE BLD GHB EST-MCNC: 166 MG/DL
HBA1C MFR BLD: 7.4 %

## 2024-03-26 ENCOUNTER — TELEPHONE (OUTPATIENT)
Dept: NEPHROLOGY | Facility: CLINIC | Age: 74
End: 2024-03-26

## 2024-03-26 NOTE — TELEPHONE ENCOUNTER
I called and spoke with Sonya. She states she is going to the lab Thursday to complete labs for her upcoming appt on 04/02/2024.

## 2024-03-28 ENCOUNTER — APPOINTMENT (OUTPATIENT)
Dept: LAB | Facility: MEDICAL CENTER | Age: 74
End: 2024-03-28
Payer: MEDICARE

## 2024-03-28 LAB
ALBUMIN SERPL BCP-MCNC: 4.3 G/DL (ref 3.5–5)
ALP SERPL-CCNC: 68 U/L (ref 34–104)
ALT SERPL W P-5'-P-CCNC: 29 U/L (ref 7–52)
ANION GAP SERPL CALCULATED.3IONS-SCNC: 12 MMOL/L (ref 4–13)
AST SERPL W P-5'-P-CCNC: 31 U/L (ref 13–39)
BACTERIA UR QL AUTO: ABNORMAL /HPF
BASOPHILS # BLD AUTO: 0.03 THOUSANDS/ÂΜL (ref 0–0.1)
BASOPHILS NFR BLD AUTO: 0 % (ref 0–1)
BILIRUB SERPL-MCNC: 0.66 MG/DL (ref 0.2–1)
BILIRUB UR QL STRIP: NEGATIVE
BUN SERPL-MCNC: 31 MG/DL (ref 5–25)
CALCIUM SERPL-MCNC: 9.3 MG/DL (ref 8.4–10.2)
CHLORIDE SERPL-SCNC: 101 MMOL/L (ref 96–108)
CLARITY UR: CLEAR
CO2 SERPL-SCNC: 28 MMOL/L (ref 21–32)
COLOR UR: ABNORMAL
CREAT SERPL-MCNC: 1.41 MG/DL (ref 0.6–1.3)
CREAT UR-MCNC: 26.4 MG/DL
EOSINOPHIL # BLD AUTO: 0.17 THOUSAND/ÂΜL (ref 0–0.61)
EOSINOPHIL NFR BLD AUTO: 2 % (ref 0–6)
ERYTHROCYTE [DISTWIDTH] IN BLOOD BY AUTOMATED COUNT: 12.8 % (ref 11.6–15.1)
GFR SERPL CREATININE-BSD FRML MDRD: 36 ML/MIN/1.73SQ M
GLUCOSE P FAST SERPL-MCNC: 142 MG/DL (ref 65–99)
GLUCOSE UR STRIP-MCNC: ABNORMAL MG/DL
HCT VFR BLD AUTO: 42.8 % (ref 34.8–46.1)
HGB BLD-MCNC: 14.6 G/DL (ref 11.5–15.4)
HGB UR QL STRIP.AUTO: NEGATIVE
IMM GRANULOCYTES # BLD AUTO: 0.03 THOUSAND/UL (ref 0–0.2)
IMM GRANULOCYTES NFR BLD AUTO: 0 % (ref 0–2)
KETONES UR STRIP-MCNC: NEGATIVE MG/DL
LEUKOCYTE ESTERASE UR QL STRIP: NEGATIVE
LYMPHOCYTES # BLD AUTO: 3.1 THOUSANDS/ÂΜL (ref 0.6–4.47)
LYMPHOCYTES NFR BLD AUTO: 39 % (ref 14–44)
MCH RBC QN AUTO: 33.9 PG (ref 26.8–34.3)
MCHC RBC AUTO-ENTMCNC: 34.1 G/DL (ref 31.4–37.4)
MCV RBC AUTO: 99 FL (ref 82–98)
MICROALBUMIN UR-MCNC: <7 MG/L
MICROALBUMIN/CREAT 24H UR: <27 MG/G CREATININE (ref 0–30)
MONOCYTES # BLD AUTO: 0.62 THOUSAND/ÂΜL (ref 0.17–1.22)
MONOCYTES NFR BLD AUTO: 8 % (ref 4–12)
NEUTROPHILS # BLD AUTO: 3.94 THOUSANDS/ÂΜL (ref 1.85–7.62)
NEUTS SEG NFR BLD AUTO: 51 % (ref 43–75)
NITRITE UR QL STRIP: NEGATIVE
NON-SQ EPI CELLS URNS QL MICRO: ABNORMAL /HPF
NRBC BLD AUTO-RTO: 0 /100 WBCS
PH UR STRIP.AUTO: 6 [PH]
PHOSPHATE SERPL-MCNC: 3.8 MG/DL (ref 2.3–4.1)
PLATELET # BLD AUTO: 143 THOUSANDS/UL (ref 149–390)
PMV BLD AUTO: 12.5 FL (ref 8.9–12.7)
POTASSIUM SERPL-SCNC: 4 MMOL/L (ref 3.5–5.3)
PROT SERPL-MCNC: 7.5 G/DL (ref 6.4–8.4)
PROT UR STRIP-MCNC: NEGATIVE MG/DL
RBC # BLD AUTO: 4.31 MILLION/UL (ref 3.81–5.12)
RBC #/AREA URNS AUTO: ABNORMAL /HPF
SODIUM SERPL-SCNC: 141 MMOL/L (ref 135–147)
SP GR UR STRIP.AUTO: 1.01 (ref 1–1.03)
UROBILINOGEN UR STRIP-ACNC: <2 MG/DL
WBC # BLD AUTO: 7.89 THOUSAND/UL (ref 4.31–10.16)
WBC #/AREA URNS AUTO: ABNORMAL /HPF

## 2024-03-28 PROCEDURE — 80053 COMPREHEN METABOLIC PANEL: CPT

## 2024-03-28 PROCEDURE — 82043 UR ALBUMIN QUANTITATIVE: CPT

## 2024-03-28 PROCEDURE — 85025 COMPLETE CBC W/AUTO DIFF WBC: CPT

## 2024-03-28 PROCEDURE — 82570 ASSAY OF URINE CREATININE: CPT

## 2024-03-28 PROCEDURE — 84100 ASSAY OF PHOSPHORUS: CPT

## 2024-03-28 PROCEDURE — 81001 URINALYSIS AUTO W/SCOPE: CPT

## 2024-04-02 ENCOUNTER — OFFICE VISIT (OUTPATIENT)
Dept: NEPHROLOGY | Facility: CLINIC | Age: 74
End: 2024-04-02
Payer: MEDICARE

## 2024-04-02 VITALS
OXYGEN SATURATION: 96 % | DIASTOLIC BLOOD PRESSURE: 82 MMHG | HEIGHT: 64 IN | WEIGHT: 219 LBS | BODY MASS INDEX: 37.39 KG/M2 | SYSTOLIC BLOOD PRESSURE: 124 MMHG | HEART RATE: 61 BPM

## 2024-04-02 DIAGNOSIS — E55.9 VITAMIN D DEFICIENCY: ICD-10-CM

## 2024-04-02 DIAGNOSIS — E11.21 DIABETIC NEPHROPATHY ASSOCIATED WITH TYPE 2 DIABETES MELLITUS (HCC): ICD-10-CM

## 2024-04-02 DIAGNOSIS — N18.30 BENIGN HYPERTENSION WITH CKD (CHRONIC KIDNEY DISEASE) STAGE III (HCC): ICD-10-CM

## 2024-04-02 DIAGNOSIS — N18.32 STAGE 3B CHRONIC KIDNEY DISEASE (HCC): Primary | ICD-10-CM

## 2024-04-02 DIAGNOSIS — I12.9 BENIGN HYPERTENSION WITH CKD (CHRONIC KIDNEY DISEASE) STAGE III (HCC): ICD-10-CM

## 2024-04-02 DIAGNOSIS — N25.81 SECONDARY HYPERPARATHYROIDISM OF RENAL ORIGIN (HCC): ICD-10-CM

## 2024-04-02 DIAGNOSIS — E66.01 OBESITY, MORBID (HCC): ICD-10-CM

## 2024-04-02 PROCEDURE — 99214 OFFICE O/P EST MOD 30 MIN: CPT | Performed by: NURSE PRACTITIONER

## 2024-04-02 RX ORDER — INSULIN LISPRO 100 [IU]/ML
INJECTION, SOLUTION INTRAVENOUS; SUBCUTANEOUS
COMMUNITY
Start: 2024-02-28

## 2024-04-02 RX ORDER — FUROSEMIDE 20 MG/1
TABLET ORAL
COMMUNITY
Start: 2024-03-22

## 2024-04-02 RX ORDER — METOPROLOL TARTRATE 50 MG/1
TABLET, FILM COATED ORAL
COMMUNITY
Start: 2024-03-22

## 2024-04-02 NOTE — PATIENT INSTRUCTIONS
It was nice to see you today. Please have kidney ultrasound done at your convenience. Labs in 6 months

## 2024-04-02 NOTE — PROGRESS NOTES
Nephrology   Office Follow-Up  Sonya Martell 74 y.o. female MRN: [id number]    Encounter: [id number]        Assessment & Plan    Sonya Martell was seen in the [place] office today. All diagnoses and orders for visit:     1. Stage 3b chronic kidney disease (HCC)  Baseline creatinine 1.3-1.5 mg/dL dating back to 2016. No imaging to review. Grade A1 albuminuria. Etiology presumed diabetic kidney disease, obesity mediated, HTN nephrosclerosis  A1C 7.4%. re-established with LYNDSAY Mina. Tolerating losartan, Farxiga, Kerendia well. No noted side effects. No changes made  Blood pressure 124/82 mmHg- no changes made  BMI 37- tolerating Rybelsus well without side effect  Mild LE edema. No change to lasix nor Farxiga. Continue low sodium diet   Check ultrasound non-urgently. Repeat labs 6 months.   -     US kidney and bladder; Future; Expected date: 04/02/2024  -     CBC and differential; Future; Expected date: 09/09/2024  -     Comprehensive metabolic panel; Future; Expected date: 09/09/2024  -     Magnesium; Future; Expected date: 09/09/2024  -     Urinalysis with microscopic; Future; Expected date: 09/09/2024  -     Albumin / creatinine urine ratio; Future; Expected date: 09/09/2024  -     Phosphorus; Future; Expected date: 09/09/2024  2. Benign hypertension with CKD (chronic kidney disease) stage III ([facility])  Blood pressure goal <130/80 mmHg. No changes made  3. Diabetic nephropathy associated with type 2 diabetes mellitus (HCC)  A1C 7.4%. re-established with LYNDSAY Mina. Tolerating losartan, Farxiga, Kerendia well. No noted side effects. No changes made  4. Obesity, morbid (HCC)  BMI 37- tolerating Rybelsus well without side effect  5. Vitamin D deficiency  -     Vitamin D 25 hydroxy; Future; Expected date: 09/09/2024  6. Secondary hyperparathyroidism of renal origin (HCC)  No indication for activated vitamin D agent  -     PTH, intact; Future; Expected date: 09/09/2024          HPI: Sonya Martell is a 74 y.o.  female who is here for scheduled follow-up     Pertinent problems include CKD 3b, T2DM, HTN, obesity, back pain    Seeing SEvangelina Keeley again for diabetes    Kidney functions table and within typical baseline. Normotensive. No changes to medication at present. Offered kidney ultrasound and patient will self schedule. Repeat labs in 6 months prior to returning with nephrologist.     ROS:   Review of Systems   Constitutional:  Negative for chills and fever.   HENT:  Negative for ear pain and sore throat.    Eyes:  Negative for pain and visual disturbance.   Respiratory:  Negative for cough and shortness of breath.    Cardiovascular:  Negative for chest pain and palpitations.   Gastrointestinal:  Negative for abdominal pain and vomiting.   Genitourinary:  Negative for dysuria and hematuria.   Musculoskeletal:  Positive for arthralgias and back pain.   Skin:  Negative for color change and rash.   Neurological:  Negative for seizures and syncope.   All other systems reviewed and are negative.      Allergies: Patient has no known allergies.    Medications:   Current Outpatient Medications:     Alphagan P 0.1 %, , Disp: , Rfl:     aspirin 81 MG tablet, Take by mouth, Disp: , Rfl:     B Complex-Folic Acid (SUPER B COMPLEX MAXI) TABS, Take by mouth, Disp: , Rfl:     Calcium-Vitamin D 500-125 MG-UNIT TABS, Take 1,000 mg by mouth daily, Disp: , Rfl:     Cholecalciferol (VITAMIN D3) 5000 units CAPS, Take by mouth once a week Takes 3 tablets one day a week., Disp: , Rfl:     dapagliflozin 5 MG TABS, Take 5 mg by mouth daily, Disp: , Rfl:     ezetimibe (ZETIA) 10 mg tablet, Take 10 mg by mouth daily, Disp: , Rfl:     furosemide (LASIX) 20 mg tablet, , Disp: , Rfl:     glucose blood (FREESTYLE LITE) test strip, 1 each by Other route 4 (four) times a day, Disp: 100 each, Rfl: 5    Icosapent Ethyl 1 g CAPS, Take 2 capsules (2 g total) by mouth 2 (two) times a day, Disp: 120 capsule, Rfl: 5    insulin lispro (HumaLOG) 100 units/mL  "injection, Inject under the skin, Disp: , Rfl:     Kerendia 10 MG TABS, Take 20 mg by mouth daily, Disp: 90 tablet, Rfl: 3    levothyroxine 100 mcg tablet, Take 1 tablet by mouth daily, Disp: , Rfl:     losartan (COZAAR) 100 MG tablet, Take 100 mg by mouth daily, Disp: , Rfl:     metoprolol tartrate (LOPRESSOR) 50 mg tablet, , Disp: , Rfl:     Semaglutide (Rybelsus) 7 MG TABS, Take by mouth daily, Disp: , Rfl:     simvastatin (ZOCOR) 80 mg tablet, Take 80 mg by mouth daily at bedtime, Disp: , Rfl:     insulin lispro (HumALOG/ADMELOG) 100 units/mL injection, , Disp: , Rfl:     Past Medical History:   Diagnosis Date    Benign essential hypertension     Cataracts, bilateral     s/p bilateratl extraction and lens implant     Chronic kidney disease     Chronic kidney disease, stage 3 (HCC)     Diabetes mellitus (HCC)     Edema     Essential hypertension     Herpes zoster     Left upper chest    Hyperlipidemia     Hypertension     Hypothyroidism     Myocardial infarction (HCC)     Proteinuria     Renal failure syndrome     Type 2 diabetes mellitus (HCC)     Type II diabetes mellitus, uncontrolled     Vitamin D deficiency      Past Surgical History:   Procedure Laterality Date    BREAST SURGERY      Lumpectomy - Benign     CATARACT EXTRACTION, BILATERAL  2015    HYSTERECTOMY      Total     JOINT REPLACEMENT Left     Knee     JOINT REPLACEMENT Right 2017    TKR     Family History   Problem Relation Age of Onset    Other Mother         Aortic valve replacement     Diabetes type II Father     Diabetes Father               reports that she has never smoked. She has never used smokeless tobacco. She reports that she does not drink alcohol and does not use drugs.      Physical Exam:   Vitals:    24 0752   BP: 124/82   BP Location: Right arm   Patient Position: Sitting   Pulse: 61   SpO2: 96%   Weight: 99.3 kg (219 lb)   Height: 5' 4\" (1.626 m)     Body mass index is 37.59 kg/m².    General: " "conscious, cooperative, in no acute distress, appears stated age  Eyes: conjunctivae pale, anicteric sclerae  ENT: lips and mucous membranes moist  Neck: supple, no JVD, no masses  Chest:  essentially clear breath sounds bilaterally, no crackles, ronchus or wheezings  CVS: S1 & S2, normal rate, regular rhythm  Abdomen: soft, non-tender, non-distended, normoactive bowel sounds, rounded obese  Extremities: mild edema of both legs  Skin: no rash   Neuro: awake, alert, oriented       Diagnostic Data:  Lab: I have personally reviewed pertinent lab results.,   CBC:  Results from last 7 days   Lab Units 03/28/24  0928   WBC Thousand/uL 7.89   HEMOGLOBIN g/dL 14.6   HEMATOCRIT % 42.8   PLATELETS Thousands/uL 143*      CMP: No results found for: \"SODIUM\", \"K\", \"CL\", \"CO2\", \"ANIONGAP\", \"BUN\", \"CREATININE\", \"GLUCOSE\", \"CALCIUM\", \"AST\", \"ALT\", \"ALKPHOS\", \"PROT\", \"BILITOT\", \"EGFR\",   PT/INR: No results found for: \"PT\", \"INR\",   Magnesium: No components found for: \"MAG\",  Phosphorous: No results found for: \"PHOS\"    Patient Instructions   It was nice to see you today. Please have kidney ultrasound done at your convenience. Labs in 6 months     Portions of the record may have been created with voice recognition software. Occasional wrong word or \"sound a like\" substitutions may have occurred due to the inherent limitations of voice recognition software. Read the chart carefully and recognize, using context, where substitutions have occurred.    If you have any questions, please contact the dictating provider.  "

## 2024-05-03 ENCOUNTER — APPOINTMENT (OUTPATIENT)
Dept: LAB | Facility: MEDICAL CENTER | Age: 74
End: 2024-05-03
Payer: MEDICARE

## 2024-05-03 DIAGNOSIS — Z01.818 OTHER SPECIFIED PRE-OPERATIVE EXAMINATION: ICD-10-CM

## 2024-05-03 PROCEDURE — 87081 CULTURE SCREEN ONLY: CPT

## 2024-05-04 LAB — MRSA NOSE QL CULT: NORMAL

## 2024-10-01 ENCOUNTER — TELEPHONE (OUTPATIENT)
Age: 74
End: 2024-10-01

## 2024-10-02 ENCOUNTER — APPOINTMENT (OUTPATIENT)
Dept: LAB | Facility: MEDICAL CENTER | Age: 74
End: 2024-10-02
Payer: MEDICARE

## 2024-10-02 DIAGNOSIS — N18.32 STAGE 3B CHRONIC KIDNEY DISEASE (HCC): ICD-10-CM

## 2024-10-02 DIAGNOSIS — N25.81 SECONDARY HYPERPARATHYROIDISM OF RENAL ORIGIN (HCC): ICD-10-CM

## 2024-10-02 DIAGNOSIS — E55.9 VITAMIN D DEFICIENCY: ICD-10-CM

## 2024-10-02 LAB
25(OH)D3 SERPL-MCNC: 61.7 NG/ML (ref 30–100)
ALBUMIN SERPL BCG-MCNC: 4.3 G/DL (ref 3.5–5)
ALP SERPL-CCNC: 81 U/L (ref 34–104)
ALT SERPL W P-5'-P-CCNC: 22 U/L (ref 7–52)
ANION GAP SERPL CALCULATED.3IONS-SCNC: 10 MMOL/L (ref 4–13)
AST SERPL W P-5'-P-CCNC: 22 U/L (ref 13–39)
BACTERIA UR QL AUTO: ABNORMAL /HPF
BASOPHILS # BLD AUTO: 0.03 THOUSANDS/ΜL (ref 0–0.1)
BASOPHILS NFR BLD AUTO: 1 % (ref 0–1)
BILIRUB SERPL-MCNC: 0.5 MG/DL (ref 0.2–1)
BILIRUB UR QL STRIP: NEGATIVE
BUN SERPL-MCNC: 40 MG/DL (ref 5–25)
CALCIUM SERPL-MCNC: 9 MG/DL (ref 8.4–10.2)
CHLORIDE SERPL-SCNC: 100 MMOL/L (ref 96–108)
CLARITY UR: CLEAR
CO2 SERPL-SCNC: 27 MMOL/L (ref 21–32)
COLOR UR: COLORLESS
CREAT SERPL-MCNC: 1.58 MG/DL (ref 0.6–1.3)
CREAT UR-MCNC: 71.3 MG/DL
EOSINOPHIL # BLD AUTO: 0.11 THOUSAND/ΜL (ref 0–0.61)
EOSINOPHIL NFR BLD AUTO: 2 % (ref 0–6)
ERYTHROCYTE [DISTWIDTH] IN BLOOD BY AUTOMATED COUNT: 13.4 % (ref 11.6–15.1)
GFR SERPL CREATININE-BSD FRML MDRD: 31 ML/MIN/1.73SQ M
GLUCOSE P FAST SERPL-MCNC: 166 MG/DL (ref 65–99)
GLUCOSE UR STRIP-MCNC: ABNORMAL MG/DL
HCT VFR BLD AUTO: 40.6 % (ref 34.8–46.1)
HGB BLD-MCNC: 13.8 G/DL (ref 11.5–15.4)
HGB UR QL STRIP.AUTO: NEGATIVE
IMM GRANULOCYTES # BLD AUTO: 0.02 THOUSAND/UL (ref 0–0.2)
IMM GRANULOCYTES NFR BLD AUTO: 0 % (ref 0–2)
KETONES UR STRIP-MCNC: NEGATIVE MG/DL
LEUKOCYTE ESTERASE UR QL STRIP: ABNORMAL
LYMPHOCYTES # BLD AUTO: 2.64 THOUSANDS/ΜL (ref 0.6–4.47)
LYMPHOCYTES NFR BLD AUTO: 47 % (ref 14–44)
MAGNESIUM SERPL-MCNC: 2 MG/DL (ref 1.9–2.7)
MCH RBC QN AUTO: 31.9 PG (ref 26.8–34.3)
MCHC RBC AUTO-ENTMCNC: 34 G/DL (ref 31.4–37.4)
MCV RBC AUTO: 94 FL (ref 82–98)
MICROALBUMIN UR-MCNC: <7 MG/L
MONOCYTES # BLD AUTO: 0.37 THOUSAND/ΜL (ref 0.17–1.22)
MONOCYTES NFR BLD AUTO: 7 % (ref 4–12)
NEUTROPHILS # BLD AUTO: 2.38 THOUSANDS/ΜL (ref 1.85–7.62)
NEUTS SEG NFR BLD AUTO: 43 % (ref 43–75)
NITRITE UR QL STRIP: NEGATIVE
NON-SQ EPI CELLS URNS QL MICRO: ABNORMAL /HPF
NRBC BLD AUTO-RTO: 0 /100 WBCS
PH UR STRIP.AUTO: 5.5 [PH]
PHOSPHATE SERPL-MCNC: 3.5 MG/DL (ref 2.3–4.1)
PLATELET # BLD AUTO: 146 THOUSANDS/UL (ref 149–390)
PMV BLD AUTO: 12.3 FL (ref 8.9–12.7)
POTASSIUM SERPL-SCNC: 3.8 MMOL/L (ref 3.5–5.3)
PROT SERPL-MCNC: 7.5 G/DL (ref 6.4–8.4)
PROT UR STRIP-MCNC: NEGATIVE MG/DL
PTH-INTACT SERPL-MCNC: 38 PG/ML (ref 12–88)
RBC # BLD AUTO: 4.32 MILLION/UL (ref 3.81–5.12)
RBC #/AREA URNS AUTO: ABNORMAL /HPF
SODIUM SERPL-SCNC: 137 MMOL/L (ref 135–147)
SP GR UR STRIP.AUTO: 1.01 (ref 1–1.03)
UROBILINOGEN UR STRIP-ACNC: <2 MG/DL
WBC # BLD AUTO: 5.55 THOUSAND/UL (ref 4.31–10.16)
WBC #/AREA URNS AUTO: ABNORMAL /HPF

## 2024-10-02 PROCEDURE — 82306 VITAMIN D 25 HYDROXY: CPT

## 2024-10-02 PROCEDURE — 82043 UR ALBUMIN QUANTITATIVE: CPT

## 2024-10-02 PROCEDURE — 83970 ASSAY OF PARATHORMONE: CPT

## 2024-10-02 PROCEDURE — 80053 COMPREHEN METABOLIC PANEL: CPT

## 2024-10-02 PROCEDURE — 36415 COLL VENOUS BLD VENIPUNCTURE: CPT

## 2024-10-02 PROCEDURE — 84100 ASSAY OF PHOSPHORUS: CPT

## 2024-10-02 PROCEDURE — 81001 URINALYSIS AUTO W/SCOPE: CPT

## 2024-10-02 PROCEDURE — 83735 ASSAY OF MAGNESIUM: CPT

## 2024-10-02 PROCEDURE — 82570 ASSAY OF URINE CREATININE: CPT

## 2024-10-02 PROCEDURE — 85025 COMPLETE CBC W/AUTO DIFF WBC: CPT

## 2024-10-08 ENCOUNTER — OFFICE VISIT (OUTPATIENT)
Age: 74
End: 2024-10-08
Payer: MEDICARE

## 2024-10-08 VITALS
HEIGHT: 64 IN | HEART RATE: 63 BPM | WEIGHT: 209 LBS | TEMPERATURE: 97.1 F | SYSTOLIC BLOOD PRESSURE: 124 MMHG | DIASTOLIC BLOOD PRESSURE: 80 MMHG | OXYGEN SATURATION: 97 % | BODY MASS INDEX: 35.68 KG/M2

## 2024-10-08 DIAGNOSIS — N17.9 AKI (ACUTE KIDNEY INJURY) (HCC): Primary | ICD-10-CM

## 2024-10-08 DIAGNOSIS — Z79.4 TYPE 2 DIABETES MELLITUS WITH STAGE 3A CHRONIC KIDNEY DISEASE, WITH LONG-TERM CURRENT USE OF INSULIN (HCC): ICD-10-CM

## 2024-10-08 DIAGNOSIS — N18.31 TYPE 2 DIABETES MELLITUS WITH STAGE 3A CHRONIC KIDNEY DISEASE, WITH LONG-TERM CURRENT USE OF INSULIN (HCC): ICD-10-CM

## 2024-10-08 DIAGNOSIS — E11.22 TYPE 2 DIABETES MELLITUS WITH STAGE 3A CHRONIC KIDNEY DISEASE, WITH LONG-TERM CURRENT USE OF INSULIN (HCC): ICD-10-CM

## 2024-10-08 DIAGNOSIS — I12.9 HYPERTENSIVE KIDNEY DISEASE WITH STAGE 3B CHRONIC KIDNEY DISEASE (HCC): ICD-10-CM

## 2024-10-08 DIAGNOSIS — N18.32 HYPERTENSIVE KIDNEY DISEASE WITH STAGE 3B CHRONIC KIDNEY DISEASE (HCC): ICD-10-CM

## 2024-10-08 DIAGNOSIS — E11.21 DIABETIC NEPHROPATHY ASSOCIATED WITH TYPE 2 DIABETES MELLITUS (HCC): ICD-10-CM

## 2024-10-08 DIAGNOSIS — R80.1 PERSISTENT PROTEINURIA: ICD-10-CM

## 2024-10-08 DIAGNOSIS — N18.31 STAGE 3A CHRONIC KIDNEY DISEASE (HCC): ICD-10-CM

## 2024-10-08 PROCEDURE — 99215 OFFICE O/P EST HI 40 MIN: CPT | Performed by: INTERNAL MEDICINE

## 2024-10-08 PROCEDURE — G2211 COMPLEX E/M VISIT ADD ON: HCPCS | Performed by: INTERNAL MEDICINE

## 2024-10-08 RX ORDER — CEFADROXIL 500 MG/1
500 CAPSULE ORAL 2 TIMES DAILY
COMMUNITY
Start: 2024-08-13 | End: 2024-11-11

## 2024-10-08 NOTE — ASSESSMENT & PLAN NOTE
Lab Results   Component Value Date    EGFR 31 10/02/2024    EGFR 39 (L) 08/12/2024    EGFR 40 (L) 08/05/2024    CREATININE 1.58 (H) 10/02/2024    CREATININE 1.41 (H) 08/12/2024    CREATININE 1.37 (H) 08/05/2024     As noted above, kidney function is lower than typical baseline, look for her to achieve baseline closer to 1.2-1.3 mg/dL in the next few months as she continues to recover from recent septicemia.

## 2024-10-08 NOTE — ASSESSMENT & PLAN NOTE
Patient currently in mild acute kidney injury, most likely due to combination of hemodynamic and physiologic stressors from recent episode of bacteremia with Streptococcus agalactiae from hardware infection.  Unclear if this is a glomerulonephritis process, most likely not, but for completeness sake we will assess C3/C4 with next set of labs in about 1 month.  Will also complete the kidney ultrasound which was scheduled previously, and will be arranged during this visit.    In the meantime continue to optimize care and avoid potential nephrotoxins.  Patient has been avoiding all NSAIDs.

## 2024-10-08 NOTE — ASSESSMENT & PLAN NOTE
Patient proteinuria has significantly improved and has not resurfaced for the last 12-16 months.  Continue to monitor closely, optimize care in general.

## 2024-10-08 NOTE — PROGRESS NOTES
[facility] [place]  Panfilo Can DO    Name: Sonya Martell  YOB: 1950      Assessment/Plan:    RUY (acute kidney injury) ([facility])  Patient currently in mild acute kidney injury, most likely due to combination of hemodynamic and physiologic stressors from recent episode of bacteremia with Streptococcus agalactiae from hardware infection.  Unclear if this is a glomerulonephritis process, most likely not, but for completeness sake we will assess C3/C4 with next set of labs in about 1 month.  Will also complete the kidney ultrasound which was scheduled previously, and will be arranged during this visit.    In the meantime continue to optimize care and avoid potential nephrotoxins.  Patient has been avoiding all NSAIDs.    Stage 3a chronic kidney disease ([facility])  Lab Results   Component Value Date    EGFR 31 10/02/2024    EGFR 39 (L) 08/12/2024    EGFR 40 (L) 08/05/2024    CREATININE 1.58 (H) 10/02/2024    CREATININE 1.41 (H) 08/12/2024    CREATININE 1.37 (H) 08/05/2024     As noted above, kidney function is lower than typical baseline, look for her to achieve baseline closer to 1.2-1.3 mg/dL in the next few months as she continues to recover from recent septicemia.    Hypertensive kidney disease with stage 3 chronic kidney disease ([facility])  Lab Results   Component Value Date    EGFR 31 10/02/2024    EGFR 39 (L) 08/12/2024    EGFR 40 (L) 08/05/2024    CREATININE 1.58 (H) 10/02/2024    CREATININE 1.41 (H) 08/12/2024    CREATININE 1.37 (H) 08/05/2024     Blood pressure is well-controlled at this time, continue current medications.  Continue to encourage low-sodium diet.    Diabetic nephropathy associated with type 2 diabetes mellitus ([facility])  Patient continues to have no proteinuria, continue with Farxiga, losartan, and Kerendia.  Will consider adjustment of these medications going forward depending she progresses from a clinical standpoint.      Lab Results   Component Value  Date    HGBA1C 6.4 (H) 07/03/2024       Persistent proteinuria  Patient proteinuria has significantly improved and has not resurfaced for the last 12-16 months.  Continue to monitor closely, optimize care in general.         Problem List Items Addressed This Visit          Endocrine    Type 2 diabetes mellitus with chronic kidney disease, with long-term current use of insulin ([facility])    Relevant Orders    Hemoglobin A1C    Diabetic nephropathy associated with type 2 diabetes mellitus ([facility])     Patient continues to have no proteinuria, continue with Farxiga, losartan, and Kerendia.  Will consider adjustment of these medications going forward depending she progresses from a clinical standpoint.      Lab Results   Component Value Date    HGBA1C 6.4 (H) 07/03/2024               Genitourinary    Hypertensive kidney disease with stage 3 chronic kidney disease ([facility])     Lab Results   Component Value Date    EGFR 31 10/02/2024    EGFR 39 (L) 08/12/2024    EGFR 40 (L) 08/05/2024    CREATININE 1.58 (H) 10/02/2024    CREATININE 1.41 (H) 08/12/2024    CREATININE 1.37 (H) 08/05/2024     Blood pressure is well-controlled at this time, continue current medications.  Continue to encourage low-sodium diet.         RUY (acute kidney injury) ([facility]) - Primary     Patient currently in mild acute kidney injury, most likely due to combination of hemodynamic and physiologic stressors from recent episode of bacteremia with Streptococcus agalactiae from hardware infection.  Unclear if this is a glomerulonephritis process, most likely not, but for completeness sake we will assess C3/C4 with next set of labs in about 1 month.  Will also complete the kidney ultrasound which was scheduled previously, and will be arranged during this visit.    In the meantime continue to optimize care and avoid potential nephrotoxins.  Patient has been avoiding all NSAIDs.         Relevant Orders    C3 complement    C4 complement    Urinalysis with microscopic     Basic metabolic panel    Stage 3a chronic kidney disease (HCC)     Lab Results   Component Value Date    EGFR 31 10/02/2024    EGFR 39 (L) 08/12/2024    EGFR 40 (L) 08/05/2024    CREATININE 1.58 (H) 10/02/2024    CREATININE 1.41 (H) 08/12/2024    CREATININE 1.37 (H) 08/05/2024     As noted above, kidney function is lower than typical baseline, look for her to achieve baseline closer to 1.2-1.3 mg/dL in the next few months as she continues to recover from recent septicemia.         Relevant Orders    Comprehensive metabolic panel    CBC and differential    Albumin / creatinine urine ratio    Urinalysis with microscopic    Magnesium    Phosphorus    PTH, intact       Other    BMI 35.0-35.9,adult    Persistent proteinuria     Patient proteinuria has significantly improved and has not resurfaced for the last 12-16 months.  Continue to monitor closely, optimize care in general.            Patient is currently in acute kidney injury, potentially related to recent septicemia from back surgery.  Will reassess labs in about 1 month, will also check additional labs at that time please refer above.  Patient will also have a kidney ultrasound to rule out anatomic issues which may also be contributing and reduced kidney function.  Additional imaging and testing may be indicated depending on she progresses clinically.    As long as all is well, we will plan to see her for her next regular appointment in approximately 6 months.    Subjective:      Patient ID: Sonya Martell is a 74 y.o. female.    Patient presents for follow up.    We reviewed labs in detail, most recent creatinine noted to be 1.58 mg/dL, estimate GFR is 31 mL/min.  Previously, baseline creatinine was closer to 1.2-1.3 mg/dL.  Since we last saw the patient, she had back surgery which resulted in infection of hardware, with opening of prior incisional site and incision and drainage of abscess, she was on ceftriaxone for 6 weeks and is currently on cefadroxil 500  mg twice daily.    There were no significant electrolyte abnormalities noted.  Patient is taking all medications as prescribed with no specific side effects and denies use of nonsteroid anti-inflammatory medications.              The following portions of the patient's history were reviewed and updated as appropriate: allergies, current medications, past family history, past medical history, past social history, past surgical history and problem list.    Review of Systems   All other systems reviewed and are negative.        Social History     Socioeconomic History    Marital status: /Civil Union     Spouse name: None    Number of children: None    Years of education: None    Highest education level: None   Occupational History    Occupation: Retired -     Tobacco Use    Smoking status: Never    Smokeless tobacco: Never   Substance and Sexual Activity    Alcohol use: No    Drug use: No    Sexual activity: None   Other Topics Concern    None   Social History Narrative    Consumes on average 1 regular cup of coffee per day    Consumes on average 8 oz of soda per day - diet      Social Determinants of Health     Financial Resource Strain: Low Risk  (7/5/2024)    Received from [facility]    Overall Financial Resource Strain (CARDIA)     Difficulty of Paying Living Expenses: Not hard at all   Food Insecurity: No Food Insecurity (7/5/2024)    Received from [facility]    Hunger Vital Sign     Worried About Running Out of Food in the Last Year: Never true     Ran Out of Food in the Last Year: Never true   Transportation Needs: No Transportation Needs (7/5/2024)    Received from [facility]    PRAPARE - Transportation     Lack of Transportation (Medical): No     Lack of Transportation (Non-Medical): No   Physical Activity: Not on file   Stress: Not on file   Social Connections: Unknown (6/18/2024)    Received from [facility]      How often do you feel lonely or isolated from those around you? (Adult - for ages 18 years and over): Not on file   Intimate Partner Violence: Not At Risk (7/5/2024)    [facility]    Humiliation, Afraid, Rape, and Kick questionnaire     Fear of Current or Ex-Partner: No     Emotionally Abused: No     Physically Abused: No     Sexually Abused: No   Housing Stability: Low Risk  (7/5/2024)    [facility]    Housing Stability Vital Sign     Unable to Pay for Housing in the Last Year: No     Number of Times Moved in the Last Year: 0     Homeless in the Last Year: No     Past Medical History:   Diagnosis Date    Benign essential hypertension     Cataracts, bilateral     s/p bilateratl extraction and lens implant     Chronic kidney disease     Chronic kidney disease, stage 3 (HCC)     Diabetes mellitus (HCC)     Edema     Essential hypertension     Herpes zoster     Left upper chest    Hyperlipidemia     Hypertension     Hypothyroidism     Myocardial infarction (HCC)     Proteinuria     Renal failure syndrome     Type 2 diabetes mellitus (HCC)     Type II diabetes mellitus, uncontrolled     Vitamin D deficiency      Past Surgical History:   Procedure Laterality Date    BREAST SURGERY  1996    Lumpectomy - Benign     CATARACT EXTRACTION, BILATERAL  12/2015    HYSTERECTOMY      Total     JOINT REPLACEMENT Left 2012    Knee     JOINT REPLACEMENT Right 01/19/2017    TKR       Current Outpatient Medications:     Acetaminophen (TYLENOL EXTRA STRENGTH PO), Take 500 mg by mouth every 4 (four) hours as needed, Disp: , Rfl:     Alphagan P 0.1 %, , Disp: , Rfl:     aspirin 81 MG tablet, Take by mouth She does not take this everyday., Disp: , Rfl:     B Complex-Folic Acid (SUPER B COMPLEX MAXI) TABS, Take by mouth, Disp: , Rfl:     Calcium-Vitamin D 500-125 MG-UNIT TABS, Take 1,000 mg by mouth daily, Disp: , Rfl:     cefadroxil (DURICEF) 500 mg capsule, Take 500 mg by  mouth 2 (two) times a day, Disp: , Rfl:     Cholecalciferol (VITAMIN D3) 5000 units CAPS, Take by mouth once a week Takes 3 tablets one day a week., Disp: , Rfl:     dapagliflozin 5 MG TABS, Take 5 mg by mouth daily, Disp: , Rfl:     ezetimibe (ZETIA) 10 mg tablet, Take 10 mg by mouth daily, Disp: , Rfl:     furosemide (LASIX) 20 mg tablet, Take 40 mg by mouth daily as needed (for swelling), Disp: , Rfl:     glucose blood (FREESTYLE LITE) test strip, 1 each by Other route 4 (four) times a day, Disp: 100 each, Rfl: 5    Icosapent Ethyl 1 g CAPS, Take 2 capsules (2 g total) by mouth 2 (two) times a day, Disp: 120 capsule, Rfl: 5    insulin lispro (HumALOG/ADMELOG) 100 units/mL injection, , Disp: , Rfl:     Kerendia 10 MG TABS, Take 20 mg by mouth daily, Disp: 90 tablet, Rfl: 3    levothyroxine 100 mcg tablet, Take 1 tablet by mouth daily, Disp: , Rfl:     losartan (COZAAR) 100 MG tablet, Take 100 mg by mouth daily, Disp: , Rfl:     metoprolol tartrate (LOPRESSOR) 50 mg tablet, , Disp: , Rfl:     Semaglutide (Rybelsus) 7 MG TABS, Take by mouth daily, Disp: , Rfl:     simvastatin (ZOCOR) 80 mg tablet, Take 80 mg by mouth daily at bedtime, Disp: , Rfl:     Lab Results   Component Value Date     06/16/2015    SODIUM 137 10/02/2024    K 3.8 10/02/2024     10/02/2024    CO2 27 10/02/2024    ANIONGAP 10 06/16/2015    AGAP 10 10/02/2024    BUN 40 (H) 10/02/2024    CREATININE 1.58 (H) 10/02/2024    GLUC 140 (H) 07/04/2024    GLUF 166 (H) 10/02/2024    CALCIUM 9.0 10/02/2024    AST 22 10/02/2024    ALT 22 10/02/2024    ALKPHOS 81 10/02/2024    PROT 7.3 05/07/2015    TP 7.5 10/02/2024    BILITOT 0.5 05/07/2015    TBILI 0.50 10/02/2024    EGFR 31 10/02/2024     Lab Results   Component Value Date    WBC 5.55 10/02/2024    HGB 13.8 10/02/2024    HCT 40.6 10/02/2024    MCV 94 10/02/2024     (L) 10/02/2024     Lab Results   Component Value Date    CHOLESTEROL 181 04/16/2021    CHOLESTEROL 170 10/20/2020     "CHOLESTEROL 199 04/20/2020     Lab Results   Component Value Date    HDL 41 04/16/2021    HDL 43 10/20/2020    HDL 36 (L) 04/20/2020     Lab Results   Component Value Date    LDLCALC 87 04/16/2021    LDLCALC 88 10/20/2020    LDLCALC  04/20/2020      Comment:      Calculated LDL invalid, triglycerides >400 mg/dl  This screening LDL is a calculated result.   It does not have the accuracy of the Direct Measured LDL in the monitoring of patients with hyperlipidemia and/or statin therapy.   Direct Measure LDL (YVQ014) must be ordered separately in these patients.     Lab Results   Component Value Date    TRIG 264 (H) 04/16/2021    TRIG 196 (H) 10/20/2020    TRIG 408 (H) 04/20/2020     No results found for: \"CHOLHDL\"  Lab Results   Component Value Date    FUZ2BCVBNZAH 2.380 04/16/2021     Lab Results   Component Value Date    PTH 38.0 10/02/2024    CALCIUM 9.0 10/02/2024    PHOS 3.5 10/02/2024     No results found for: \"SPEP\", \"UPEP\"  No results found for: \"MICROALBUR\", \"IVEG26CTL\"        Objective:      /80 (BP Location: Right arm, Patient Position: Sitting, Cuff Size: Large)   Pulse 63   Temp (!) 97.1 °F (36.2 °C) (Tympanic)   Ht 5' 4\" (1.626 m)   Wt 94.8 kg (209 lb)   SpO2 97%   BMI 35.87 kg/m²          Physical Exam  Vitals reviewed.   Constitutional:       General: She is not in acute distress.     Appearance: Normal appearance.   HENT:      Head: Normocephalic and atraumatic.      Right Ear: External ear normal.      Left Ear: External ear normal.   Eyes:      Conjunctiva/sclera: Conjunctivae normal.   Cardiovascular:      Rate and Rhythm: Normal rate and regular rhythm.      Heart sounds: Normal heart sounds.   Pulmonary:      Effort: No respiratory distress.      Breath sounds: No wheezing.   Abdominal:      Palpations: Abdomen is soft.   Skin:     General: Skin is warm and dry.   Neurological:      General: No focal deficit present.      Mental Status: She is alert and oriented to person, place, and " time.   Psychiatric:         Mood and Affect: Mood normal.         Behavior: Behavior normal.

## 2024-10-08 NOTE — ASSESSMENT & PLAN NOTE
Lab Results   Component Value Date    EGFR 31 10/02/2024    EGFR 39 (L) 08/12/2024    EGFR 40 (L) 08/05/2024    CREATININE 1.58 (H) 10/02/2024    CREATININE 1.41 (H) 08/12/2024    CREATININE 1.37 (H) 08/05/2024     Blood pressure is well-controlled at this time, continue current medications.  Continue to encourage low-sodium diet.

## 2024-10-08 NOTE — ASSESSMENT & PLAN NOTE
Patient continues to have no proteinuria, continue with Farxiga, losartan, and Kerendia.  Will consider adjustment of these medications going forward depending she progresses from a clinical standpoint.      Lab Results   Component Value Date    HGBA1C 6.4 (H) 07/03/2024

## 2024-10-14 ENCOUNTER — HOSPITAL ENCOUNTER (OUTPATIENT)
Dept: ULTRASOUND IMAGING | Facility: HOSPITAL | Age: 74
Discharge: HOME | End: 2024-10-14
Payer: MEDICARE

## 2024-10-14 DIAGNOSIS — N18.32 STAGE 3B CHRONIC KIDNEY DISEASE (HCC): ICD-10-CM

## 2024-10-14 PROCEDURE — 76775 US EXAM ABDO BACK WALL LIM: CPT

## 2024-10-18 ENCOUNTER — TELEPHONE (OUTPATIENT)
Dept: NEPHROLOGY | Facility: CLINIC | Age: 74
End: 2024-10-18

## 2024-10-18 NOTE — TELEPHONE ENCOUNTER
I called and spoke with Sonya regarding the following information:    ----- Message from LEONOR Stanton sent at 10/17/2024 10:04 PM EDT -----  Please advise patient her renal ultrasound indicates bilateral simple cysts, patient not problematic and require no intervention.  There are no kidney stones or urine backing up into kidneys.  Thank you.    She expressed an understanding. She had no questions or concerns.

## 2024-11-04 ENCOUNTER — APPOINTMENT (OUTPATIENT)
Dept: LAB | Facility: MEDICAL CENTER | Age: 74
End: 2024-11-04
Payer: MEDICARE

## 2024-11-04 DIAGNOSIS — N17.9 AKI (ACUTE KIDNEY INJURY) (HCC): ICD-10-CM

## 2024-11-04 DIAGNOSIS — Z79.4 TYPE 2 DIABETES MELLITUS WITH STAGE 3A CHRONIC KIDNEY DISEASE, WITH LONG-TERM CURRENT USE OF INSULIN (HCC): ICD-10-CM

## 2024-11-04 DIAGNOSIS — T84.7XXD HARDWARE COMPLICATING WOUND INFECTION, SUBSEQUENT ENCOUNTER: ICD-10-CM

## 2024-11-04 DIAGNOSIS — N18.31 TYPE 2 DIABETES MELLITUS WITH STAGE 3A CHRONIC KIDNEY DISEASE, WITH LONG-TERM CURRENT USE OF INSULIN (HCC): ICD-10-CM

## 2024-11-04 DIAGNOSIS — E11.22 TYPE 2 DIABETES MELLITUS WITH STAGE 3A CHRONIC KIDNEY DISEASE, WITH LONG-TERM CURRENT USE OF INSULIN (HCC): ICD-10-CM

## 2024-11-04 LAB
ANION GAP SERPL CALCULATED.3IONS-SCNC: 9 MMOL/L (ref 4–13)
BACTERIA UR QL AUTO: ABNORMAL /HPF
BASOPHILS # BLD AUTO: 0.03 THOUSANDS/ΜL (ref 0–0.1)
BASOPHILS NFR BLD AUTO: 1 % (ref 0–1)
BILIRUB UR QL STRIP: NEGATIVE
BUN SERPL-MCNC: 24 MG/DL (ref 5–25)
C3 SERPL-MCNC: 186 MG/DL (ref 87–200)
C4 SERPL-MCNC: 56 MG/DL (ref 19–52)
CALCIUM SERPL-MCNC: 9.5 MG/DL (ref 8.4–10.2)
CHLORIDE SERPL-SCNC: 100 MMOL/L (ref 96–108)
CLARITY UR: CLEAR
CO2 SERPL-SCNC: 32 MMOL/L (ref 21–32)
COLOR UR: COLORLESS
CREAT SERPL-MCNC: 1.12 MG/DL (ref 0.6–1.3)
CRP SERPL QL: 6.3 MG/L
EOSINOPHIL # BLD AUTO: 0.12 THOUSAND/ΜL (ref 0–0.61)
EOSINOPHIL NFR BLD AUTO: 2 % (ref 0–6)
ERYTHROCYTE [DISTWIDTH] IN BLOOD BY AUTOMATED COUNT: 13.2 % (ref 11.6–15.1)
EST. AVERAGE GLUCOSE BLD GHB EST-MCNC: 163 MG/DL
GFR SERPL CREATININE-BSD FRML MDRD: 48 ML/MIN/1.73SQ M
GLUCOSE P FAST SERPL-MCNC: 112 MG/DL (ref 65–99)
GLUCOSE UR STRIP-MCNC: ABNORMAL MG/DL
HBA1C MFR BLD: 7.3 %
HCT VFR BLD AUTO: 43.9 % (ref 34.8–46.1)
HGB BLD-MCNC: 15 G/DL (ref 11.5–15.4)
HGB UR QL STRIP.AUTO: NEGATIVE
HYALINE CASTS #/AREA URNS LPF: ABNORMAL /LPF
IMM GRANULOCYTES # BLD AUTO: 0.01 THOUSAND/UL (ref 0–0.2)
IMM GRANULOCYTES NFR BLD AUTO: 0 % (ref 0–2)
KETONES UR STRIP-MCNC: NEGATIVE MG/DL
LEUKOCYTE ESTERASE UR QL STRIP: ABNORMAL
LYMPHOCYTES # BLD AUTO: 2.52 THOUSANDS/ΜL (ref 0.6–4.47)
LYMPHOCYTES NFR BLD AUTO: 41 % (ref 14–44)
MCH RBC QN AUTO: 32.7 PG (ref 26.8–34.3)
MCHC RBC AUTO-ENTMCNC: 34.2 G/DL (ref 31.4–37.4)
MCV RBC AUTO: 96 FL (ref 82–98)
MONOCYTES # BLD AUTO: 0.38 THOUSAND/ΜL (ref 0.17–1.22)
MONOCYTES NFR BLD AUTO: 6 % (ref 4–12)
NEUTROPHILS # BLD AUTO: 3.06 THOUSANDS/ΜL (ref 1.85–7.62)
NEUTS SEG NFR BLD AUTO: 50 % (ref 43–75)
NITRITE UR QL STRIP: NEGATIVE
NON-SQ EPI CELLS URNS QL MICRO: ABNORMAL /HPF
NRBC BLD AUTO-RTO: 0 /100 WBCS
PH UR STRIP.AUTO: 6 [PH]
PLATELET # BLD AUTO: 165 THOUSANDS/UL (ref 149–390)
PMV BLD AUTO: 11.9 FL (ref 8.9–12.7)
POTASSIUM SERPL-SCNC: 3.8 MMOL/L (ref 3.5–5.3)
PROT UR STRIP-MCNC: NEGATIVE MG/DL
RBC # BLD AUTO: 4.59 MILLION/UL (ref 3.81–5.12)
RBC #/AREA URNS AUTO: ABNORMAL /HPF
SODIUM SERPL-SCNC: 141 MMOL/L (ref 135–147)
SP GR UR STRIP.AUTO: 1.01 (ref 1–1.03)
UROBILINOGEN UR STRIP-ACNC: <2 MG/DL
WBC # BLD AUTO: 6.12 THOUSAND/UL (ref 4.31–10.16)
WBC #/AREA URNS AUTO: ABNORMAL /HPF

## 2024-11-04 PROCEDURE — 81001 URINALYSIS AUTO W/SCOPE: CPT

## 2024-11-04 PROCEDURE — 83036 HEMOGLOBIN GLYCOSYLATED A1C: CPT

## 2024-11-04 PROCEDURE — 36415 COLL VENOUS BLD VENIPUNCTURE: CPT

## 2024-11-04 PROCEDURE — 86140 C-REACTIVE PROTEIN: CPT

## 2024-11-04 PROCEDURE — 85025 COMPLETE CBC W/AUTO DIFF WBC: CPT

## 2024-11-04 PROCEDURE — 80048 BASIC METABOLIC PNL TOTAL CA: CPT

## 2024-11-04 PROCEDURE — 86160 COMPLEMENT ANTIGEN: CPT

## 2024-11-05 ENCOUNTER — TELEPHONE (OUTPATIENT)
Age: 74
End: 2024-11-05

## 2024-11-05 NOTE — TELEPHONE ENCOUNTER
I called and spoke with Sonya regarding the following:    ----- Message from Nirmal Villalba PA-C sent at 11/5/2024  3:16 PM EST -----  Kidney function improved on most recent check.  Lab results to be reviewed at upcoming office appointment      She is aware of results. She had no questions or concerns.

## 2024-12-11 ENCOUNTER — APPOINTMENT (OUTPATIENT)
Dept: LAB | Facility: MEDICAL CENTER | Age: 74
End: 2024-12-11
Payer: MEDICARE

## 2024-12-11 DIAGNOSIS — T84.7XXD HARDWARE COMPLICATING WOUND INFECTION, SUBSEQUENT ENCOUNTER: ICD-10-CM

## 2024-12-11 LAB
BASOPHILS # BLD AUTO: 0.03 THOUSANDS/ÂΜL (ref 0–0.1)
BASOPHILS NFR BLD AUTO: 1 % (ref 0–1)
CRP SERPL QL: 2.9 MG/L
EOSINOPHIL # BLD AUTO: 0.15 THOUSAND/ÂΜL (ref 0–0.61)
EOSINOPHIL NFR BLD AUTO: 2 % (ref 0–6)
ERYTHROCYTE [DISTWIDTH] IN BLOOD BY AUTOMATED COUNT: 13.3 % (ref 11.6–15.1)
HCT VFR BLD AUTO: 40.8 % (ref 34.8–46.1)
HGB BLD-MCNC: 13.6 G/DL (ref 11.5–15.4)
IMM GRANULOCYTES # BLD AUTO: 0.02 THOUSAND/UL (ref 0–0.2)
IMM GRANULOCYTES NFR BLD AUTO: 0 % (ref 0–2)
LYMPHOCYTES # BLD AUTO: 3.27 THOUSANDS/ÂΜL (ref 0.6–4.47)
LYMPHOCYTES NFR BLD AUTO: 52 % (ref 14–44)
MCH RBC QN AUTO: 32.8 PG (ref 26.8–34.3)
MCHC RBC AUTO-ENTMCNC: 33.3 G/DL (ref 31.4–37.4)
MCV RBC AUTO: 98 FL (ref 82–98)
MONOCYTES # BLD AUTO: 0.34 THOUSAND/ÂΜL (ref 0.17–1.22)
MONOCYTES NFR BLD AUTO: 5 % (ref 4–12)
NEUTROPHILS # BLD AUTO: 2.56 THOUSANDS/ÂΜL (ref 1.85–7.62)
NEUTS SEG NFR BLD AUTO: 40 % (ref 43–75)
NRBC BLD AUTO-RTO: 0 /100 WBCS
PLATELET # BLD AUTO: 153 THOUSANDS/UL (ref 149–390)
PMV BLD AUTO: 13.2 FL (ref 8.9–12.7)
RBC # BLD AUTO: 4.15 MILLION/UL (ref 3.81–5.12)
WBC # BLD AUTO: 6.37 THOUSAND/UL (ref 4.31–10.16)

## 2024-12-11 PROCEDURE — 85025 COMPLETE CBC W/AUTO DIFF WBC: CPT

## 2024-12-11 PROCEDURE — 36415 COLL VENOUS BLD VENIPUNCTURE: CPT

## 2024-12-11 PROCEDURE — 86140 C-REACTIVE PROTEIN: CPT

## 2025-04-04 ENCOUNTER — TELEPHONE (OUTPATIENT)
Age: 75
End: 2025-04-04

## 2025-04-04 NOTE — TELEPHONE ENCOUNTER
I called and spoke with Sonya regarding completing blood and urine studies prior to upcoming appt on 04/11/2025

## 2025-04-08 ENCOUNTER — APPOINTMENT (OUTPATIENT)
Dept: LAB | Facility: MEDICAL CENTER | Age: 75
End: 2025-04-08
Payer: MEDICARE

## 2025-04-08 DIAGNOSIS — N18.31 STAGE 3A CHRONIC KIDNEY DISEASE (HCC): ICD-10-CM

## 2025-04-08 LAB
ALBUMIN SERPL BCG-MCNC: 4.2 G/DL (ref 3.5–5)
ALP SERPL-CCNC: 74 U/L (ref 34–104)
ALT SERPL W P-5'-P-CCNC: 23 U/L (ref 7–52)
ANION GAP SERPL CALCULATED.3IONS-SCNC: 8 MMOL/L (ref 4–13)
AST SERPL W P-5'-P-CCNC: 24 U/L (ref 13–39)
BACTERIA UR QL AUTO: ABNORMAL /HPF
BASOPHILS # BLD AUTO: 0.04 THOUSANDS/ÂΜL (ref 0–0.1)
BASOPHILS NFR BLD AUTO: 1 % (ref 0–1)
BILIRUB SERPL-MCNC: 0.67 MG/DL (ref 0.2–1)
BILIRUB UR QL STRIP: NEGATIVE
BUN SERPL-MCNC: 34 MG/DL (ref 5–25)
CALCIUM SERPL-MCNC: 9.3 MG/DL (ref 8.4–10.2)
CHLORIDE SERPL-SCNC: 102 MMOL/L (ref 96–108)
CLARITY UR: CLEAR
CO2 SERPL-SCNC: 29 MMOL/L (ref 21–32)
COLOR UR: ABNORMAL
CREAT SERPL-MCNC: 1.5 MG/DL (ref 0.6–1.3)
CREAT UR-MCNC: 48.8 MG/DL
EOSINOPHIL # BLD AUTO: 0.16 THOUSAND/ÂΜL (ref 0–0.61)
EOSINOPHIL NFR BLD AUTO: 2 % (ref 0–6)
ERYTHROCYTE [DISTWIDTH] IN BLOOD BY AUTOMATED COUNT: 12.5 % (ref 11.6–15.1)
GFR SERPL CREATININE-BSD FRML MDRD: 33 ML/MIN/1.73SQ M
GLUCOSE P FAST SERPL-MCNC: 124 MG/DL (ref 65–99)
GLUCOSE UR STRIP-MCNC: ABNORMAL MG/DL
HCT VFR BLD AUTO: 42.9 % (ref 34.8–46.1)
HGB BLD-MCNC: 14.5 G/DL (ref 11.5–15.4)
HGB UR QL STRIP.AUTO: NEGATIVE
IMM GRANULOCYTES # BLD AUTO: 0.01 THOUSAND/UL (ref 0–0.2)
IMM GRANULOCYTES NFR BLD AUTO: 0 % (ref 0–2)
KETONES UR STRIP-MCNC: NEGATIVE MG/DL
LEUKOCYTE ESTERASE UR QL STRIP: NEGATIVE
LYMPHOCYTES # BLD AUTO: 3.08 THOUSANDS/ÂΜL (ref 0.6–4.47)
LYMPHOCYTES NFR BLD AUTO: 42 % (ref 14–44)
MAGNESIUM SERPL-MCNC: 1.8 MG/DL (ref 1.9–2.7)
MCH RBC QN AUTO: 33.8 PG (ref 26.8–34.3)
MCHC RBC AUTO-ENTMCNC: 33.8 G/DL (ref 31.4–37.4)
MCV RBC AUTO: 100 FL (ref 82–98)
MICROALBUMIN UR-MCNC: <7 MG/L
MONOCYTES # BLD AUTO: 0.42 THOUSAND/ÂΜL (ref 0.17–1.22)
MONOCYTES NFR BLD AUTO: 6 % (ref 4–12)
NEUTROPHILS # BLD AUTO: 3.59 THOUSANDS/ÂΜL (ref 1.85–7.62)
NEUTS SEG NFR BLD AUTO: 49 % (ref 43–75)
NITRITE UR QL STRIP: NEGATIVE
NON-SQ EPI CELLS URNS QL MICRO: ABNORMAL /HPF
NRBC BLD AUTO-RTO: 0 /100 WBCS
PH UR STRIP.AUTO: 6 [PH]
PHOSPHATE SERPL-MCNC: 3.5 MG/DL (ref 2.3–4.1)
PLATELET # BLD AUTO: 159 THOUSANDS/UL (ref 149–390)
PMV BLD AUTO: 12.7 FL (ref 8.9–12.7)
POTASSIUM SERPL-SCNC: 3.7 MMOL/L (ref 3.5–5.3)
PROT SERPL-MCNC: 7.2 G/DL (ref 6.4–8.4)
PROT UR STRIP-MCNC: NEGATIVE MG/DL
PTH-INTACT SERPL-MCNC: 31 PG/ML (ref 12–88)
RBC # BLD AUTO: 4.29 MILLION/UL (ref 3.81–5.12)
RBC #/AREA URNS AUTO: ABNORMAL /HPF
SODIUM SERPL-SCNC: 139 MMOL/L (ref 135–147)
SP GR UR STRIP.AUTO: 1.01 (ref 1–1.03)
UROBILINOGEN UR STRIP-ACNC: <2 MG/DL
WBC # BLD AUTO: 7.3 THOUSAND/UL (ref 4.31–10.16)
WBC #/AREA URNS AUTO: ABNORMAL /HPF

## 2025-04-08 PROCEDURE — 83735 ASSAY OF MAGNESIUM: CPT

## 2025-04-08 PROCEDURE — 84100 ASSAY OF PHOSPHORUS: CPT

## 2025-04-08 PROCEDURE — 82043 UR ALBUMIN QUANTITATIVE: CPT

## 2025-04-08 PROCEDURE — 85025 COMPLETE CBC W/AUTO DIFF WBC: CPT

## 2025-04-08 PROCEDURE — 83970 ASSAY OF PARATHORMONE: CPT

## 2025-04-08 PROCEDURE — 36415 COLL VENOUS BLD VENIPUNCTURE: CPT

## 2025-04-08 PROCEDURE — 80053 COMPREHEN METABOLIC PANEL: CPT

## 2025-04-08 PROCEDURE — 81001 URINALYSIS AUTO W/SCOPE: CPT

## 2025-04-08 PROCEDURE — 82570 ASSAY OF URINE CREATININE: CPT

## 2025-04-11 ENCOUNTER — OFFICE VISIT (OUTPATIENT)
Dept: NEPHROLOGY | Facility: CLINIC | Age: 75
End: 2025-04-11

## 2025-04-11 VITALS
BODY MASS INDEX: 36.19 KG/M2 | SYSTOLIC BLOOD PRESSURE: 127 MMHG | DIASTOLIC BLOOD PRESSURE: 76 MMHG | TEMPERATURE: 97 F | OXYGEN SATURATION: 97 % | HEART RATE: 63 BPM | WEIGHT: 212 LBS | HEIGHT: 64 IN

## 2025-04-11 DIAGNOSIS — E55.9 VITAMIN D DEFICIENCY: ICD-10-CM

## 2025-04-11 DIAGNOSIS — N18.32 STAGE 3B CHRONIC KIDNEY DISEASE (HCC): Primary | ICD-10-CM

## 2025-04-11 DIAGNOSIS — E11.21 DIABETIC NEPHROPATHY ASSOCIATED WITH TYPE 2 DIABETES MELLITUS (HCC): ICD-10-CM

## 2025-04-11 DIAGNOSIS — N25.81 SECONDARY HYPERPARATHYROIDISM OF RENAL ORIGIN (HCC): ICD-10-CM

## 2025-04-11 DIAGNOSIS — E66.01 OBESITY, MORBID (HCC): ICD-10-CM

## 2025-04-11 NOTE — PROGRESS NOTES
"[facility]   Office Follow-Up  Sonya Martell 75 y.o. female MRN: [id number]    Encounter: [id number]        Assessment & Plan    Sonya Martell was seen in the [place] office today. All diagnoses and orders for visit:     Assessment & Plan  Stage 3b chronic kidney disease (HCC)  Lab Results   Component Value Date    EGFR 33 04/08/2025    EGFR 48 11/04/2024    EGFR 31 10/02/2024    CREATININE 1.50 (H) 04/08/2025    CREATININE 1.12 11/04/2024    CREATININE 1.58 (H) 10/02/2024   Record review indicates baseline creatinine around 1.4-1.5 mg/dL dating back to 2015.  UA significant for glycosuria.  Grade A1 albuminuria.  No obstruction on updated renal ultrasound.  Etiology presumed to renal senescence, hypertensive nephrosclerosis, diabetic nephropathy  Follows with endocrinology for diabetic control.  Remains on losartan, Rybelsus, finerenone, Farxiga per endocrinologist-no changes.  No specific side effects related to these medications.  \"Sick day rules\" given-she will call office if ill so that medications can be held if indicated  Volume status euvolemic with mild LE edema on Lasix as needed, SGLT2 inhibitor  Blood pressure goal less than 130/80 mmHg-appropriate, no changes indicated  Vitamin D deficiency  Continue vitamin D supplementation  Secondary hyperparathyroidism of renal origin (HCC)  No indication for activated vitamin D supplement  Obesity, morbid (HCC)  Continue dietary modification  Diabetic nephropathy associated with type 2 diabetes mellitus (HCC)    Lab Results   Component Value Date    HGBA1C 7.3 (H) 11/04/2024   Management per endocrinology        HPI: Sonya Martell is a 75 y.o. female who is here for scheduled follow-up    Pertinent medical problems include: CKD 3b, type 2 diabetes mellitus, hypertension, obesity    Patient is status post right L2-3, L3-4 hemilaminectomy, left L4-5 hemilaminectomy with hardware placement May 14, 2024 complicated by " hospitalization in July 2024 for lumbar surgical site infection, hardware infection with GBS status post ceftriaxone -> 6 months of cefadroxil    Patient reports she is no longer on antibiotics.  Her pain is improved.  Her back and legs feel tired but no longer has pain    Kidney function is stable and within typical baseline.  Slightly low magnesium noted and she may take low-dose magnesium oxide 200 mg daily over-the-counter.  Repeat lab work in 6 months and then return to office            ROS:   Review of Systems   Constitutional:  Negative for chills and fever.   HENT:  Negative for ear pain and sore throat.    Eyes:  Negative for pain and visual disturbance.   Respiratory:  Negative for cough and shortness of breath.    Cardiovascular:  Negative for chest pain and palpitations.   Gastrointestinal:  Negative for abdominal pain and vomiting.   Genitourinary:  Negative for dysuria and hematuria.   Musculoskeletal:  Negative for arthralgias and back pain.        Some muscle tiredness   Skin:  Negative for color change and rash.   Neurological:  Negative for seizures and syncope.   All other systems reviewed and are negative.      Allergies: Banana extract allergy skin test - food allergy    Medications:   Current Outpatient Medications:     Alphagan P 0.1 %, , Disp: , Rfl:     aspirin 81 MG tablet, Take by mouth She does not take this everyday., Disp: , Rfl:     B Complex-Folic Acid (SUPER B COMPLEX MAXI) TABS, Take by mouth, Disp: , Rfl:     Calcium-Vitamin D 500-125 MG-UNIT TABS, Take 1,000 mg by mouth daily, Disp: , Rfl:     Cholecalciferol (VITAMIN D3) 5000 units CAPS, Take by mouth once a week Takes 3 tablets one day a week., Disp: , Rfl:     dapagliflozin 5 MG TABS, Take 5 mg by mouth daily, Disp: , Rfl:     ezetimibe (ZETIA) 10 mg tablet, Take 10 mg by mouth daily, Disp: , Rfl:     furosemide (LASIX) 20 mg tablet, Take 40 mg by mouth daily as needed (for swelling), Disp: , Rfl:     glucose blood (FREESTYLE  LITE) test strip, 1 each by Other route 4 (four) times a day, Disp: 100 each, Rfl: 5    Icosapent Ethyl 1 g CAPS, Take 2 capsules (2 g total) by mouth 2 (two) times a day, Disp: 120 capsule, Rfl: 5    insulin lispro (HumALOG/ADMELOG) 100 units/mL injection, , Disp: , Rfl:     Kerendia 10 MG TABS, Take 20 mg by mouth daily, Disp: 90 tablet, Rfl: 3    levothyroxine 100 mcg tablet, Take 1 tablet by mouth daily, Disp: , Rfl:     losartan (COZAAR) 100 MG tablet, Take 100 mg by mouth daily, Disp: , Rfl:     metoprolol tartrate (LOPRESSOR) 50 mg tablet, , Disp: , Rfl:     Semaglutide (Rybelsus) 7 MG TABS, Take by mouth daily, Disp: , Rfl:     simvastatin (ZOCOR) 80 mg tablet, Take 80 mg by mouth daily at bedtime, Disp: , Rfl:     Acetaminophen (TYLENOL EXTRA STRENGTH PO), Take 500 mg by mouth every 4 (four) hours as needed, Disp: , Rfl:     Past Medical History:   Diagnosis Date    Benign essential hypertension     Cataracts, bilateral     s/p bilateratl extraction and lens implant     Chronic kidney disease     Chronic kidney disease, stage 3 (HCC)     Diabetes mellitus (HCC)     Edema     Essential hypertension     Herpes zoster     Left upper chest    Hyperlipidemia     Hypertension     Hypothyroidism     Myocardial infarction (HCC)     Proteinuria     Renal failure syndrome     Type 2 diabetes mellitus (HCC)     Type II diabetes mellitus, uncontrolled     Vitamin D deficiency      Past Surgical History:   Procedure Laterality Date    BREAST SURGERY      Lumpectomy - Benign     CATARACT EXTRACTION, BILATERAL  2015    HYSTERECTOMY      Total     JOINT REPLACEMENT Left     Knee     JOINT REPLACEMENT Right 2017    TKR     Family History   Problem Relation Age of Onset    Other Mother         Aortic valve replacement     Diabetes type II Father     Diabetes Father               reports that she has never smoked. She has never used smokeless tobacco. She reports that she does not drink alcohol and  "does not use drugs.      Physical Exam:   Vitals:    04/11/25 0946   BP: 127/76   Pulse: 63   Temp: (!) 97 °F (36.1 °C)   SpO2: 97%   Weight: 96.2 kg (212 lb)   Height: 5' 4\" (1.626 m)     Body mass index is 36.39 kg/m².    General: conscious, cooperative, in no acute distress, appears stated age  Eyes: conjunctivae pale, anicteric sclerae  ENT: lips and mucous membranes moist  Neck: supple, no JVD, no masses  Chest:  essentially clear breath sounds bilaterally, no crackles, ronchus or wheezings  CVS: S1 & S2, normal rate, regular rhythm  Abdomen: soft, non-tender, non-distended, normoactive bowel sounds, rounded obese  Extremities: +1 edema of both legs  Skin: no rash   Neuro: awake, alert, oriented       Diagnostic Data:  Lab: I have personally reviewed pertinent lab results.,   CBC:  Results from last 7 days   Lab Units 04/08/25  0903   WBC Thousand/uL 7.30   HEMOGLOBIN g/dL 14.5   HEMATOCRIT % 42.9   PLATELETS Thousands/uL 159      CMP: No results found for: \"SODIUM\", \"K\", \"CL\", \"CO2\", \"ANIONGAP\", \"BUN\", \"CREATININE\", \"GLUCOSE\", \"CALCIUM\", \"AST\", \"ALT\", \"ALKPHOS\", \"PROT\", \"BILITOT\", \"EGFR\",   PT/INR: No results found for: \"PT\", \"INR\",   Magnesium: No components found for: \"MAG\",  Phosphorous: No results found for: \"PHOS\"    Patient Instructions   You can take a small dose of magnesium like magnesium oxide 200 mg daily over the counter    Portions of the record may have been created with voice recognition software. Occasional wrong word or \"sound a like\" substitutions may have occurred due to the inherent limitations of voice recognition software. Read the chart carefully and recognize, using context, where substitutions have occurred.    If you have any questions, please contact the dictating provider.  "